# Patient Record
Sex: FEMALE | Race: ASIAN | NOT HISPANIC OR LATINO
[De-identification: names, ages, dates, MRNs, and addresses within clinical notes are randomized per-mention and may not be internally consistent; named-entity substitution may affect disease eponyms.]

---

## 2017-08-11 ENCOUNTER — APPOINTMENT (OUTPATIENT)
Dept: PHYSICAL MEDICINE AND REHAB | Facility: CLINIC | Age: 77
End: 2017-08-11
Payer: MEDICARE

## 2017-08-11 VITALS
HEART RATE: 74 BPM | DIASTOLIC BLOOD PRESSURE: 73 MMHG | TEMPERATURE: 98.3 F | OXYGEN SATURATION: 95 % | SYSTOLIC BLOOD PRESSURE: 144 MMHG

## 2017-08-11 PROCEDURE — 99213 OFFICE O/P EST LOW 20 MIN: CPT

## 2018-04-19 ENCOUNTER — APPOINTMENT (OUTPATIENT)
Dept: PHYSICAL MEDICINE AND REHAB | Facility: CLINIC | Age: 78
End: 2018-04-19
Payer: MEDICARE

## 2018-04-19 PROCEDURE — 99212 OFFICE O/P EST SF 10 MIN: CPT

## 2018-11-02 ENCOUNTER — APPOINTMENT (OUTPATIENT)
Dept: PHYSICAL MEDICINE AND REHAB | Facility: CLINIC | Age: 78
End: 2018-11-02
Payer: MEDICARE

## 2018-11-02 VITALS
HEART RATE: 70 BPM | DIASTOLIC BLOOD PRESSURE: 73 MMHG | TEMPERATURE: 98.1 F | SYSTOLIC BLOOD PRESSURE: 125 MMHG | OXYGEN SATURATION: 97 %

## 2018-11-02 PROCEDURE — 99212 OFFICE O/P EST SF 10 MIN: CPT

## 2019-05-02 ENCOUNTER — APPOINTMENT (OUTPATIENT)
Dept: PHYSICAL MEDICINE AND REHAB | Facility: CLINIC | Age: 79
End: 2019-05-02
Payer: MEDICARE

## 2019-05-02 PROCEDURE — 99212 OFFICE O/P EST SF 10 MIN: CPT

## 2019-05-02 NOTE — PHYSICAL EXAM
[de-identified] : Decreased ROM of the left shoulder and elbow, [Normal] : Oriented to person, place, and time, insight and judgement were intact and the affect was normal [de-identified] : 5/5 MS RUE/RLE, 1/5 LUE with MAS 2 spasticity at the finger flexors. 3/5 LLE KE/DF. Steppage gait

## 2019-05-02 NOTE — ASSESSMENT
[FreeTextEntry1] : Hemiplegia, Spasticity\par - Continue PT for gait and balance training and stretching LUE\par -would try AFO with controlling for genu recurvatum , will think about it \par - F/u in 6 months

## 2019-05-02 NOTE — HISTORY OF PRESENT ILLNESS
[FreeTextEntry1] : 79-year-old female presents for followup of left-sided hemiplegia. She has  a history of hemorrhagic CVA with left sided weakness.  She  walks every day.  She reports improvement in shoulder pain.walks a little less according to son, less energy and less active overall.

## 2019-11-11 ENCOUNTER — APPOINTMENT (OUTPATIENT)
Dept: PHYSICAL MEDICINE AND REHAB | Facility: CLINIC | Age: 79
End: 2019-11-11

## 2022-07-21 ENCOUNTER — EMERGENCY (EMERGENCY)
Facility: HOSPITAL | Age: 82
LOS: 1 days | Discharge: HOME | End: 2022-07-21
Attending: EMERGENCY MEDICINE
Payer: MEDICARE

## 2022-07-21 VITALS
RESPIRATION RATE: 18 BRPM | OXYGEN SATURATION: 98 % | SYSTOLIC BLOOD PRESSURE: 146 MMHG | HEART RATE: 106 BPM | TEMPERATURE: 99 F | HEIGHT: 60 IN | DIASTOLIC BLOOD PRESSURE: 80 MMHG | WEIGHT: 119.93 LBS

## 2022-07-21 LAB
ALBUMIN SERPL ELPH-MCNC: 4.5 G/DL — SIGNIFICANT CHANGE UP (ref 3.3–5)
ALP SERPL-CCNC: 115 U/L — SIGNIFICANT CHANGE UP (ref 40–120)
ALT FLD-CCNC: 37 U/L — SIGNIFICANT CHANGE UP (ref 10–45)
ANION GAP SERPL CALC-SCNC: 14 MMOL/L — SIGNIFICANT CHANGE UP (ref 5–17)
APTT BLD: 28.9 SEC — SIGNIFICANT CHANGE UP (ref 27.5–35.5)
AST SERPL-CCNC: 36 U/L — SIGNIFICANT CHANGE UP (ref 10–40)
BASOPHILS # BLD AUTO: 0.03 K/UL — SIGNIFICANT CHANGE UP (ref 0–0.2)
BASOPHILS NFR BLD AUTO: 0.3 % — SIGNIFICANT CHANGE UP (ref 0–2)
BILIRUB SERPL-MCNC: 0.4 MG/DL — SIGNIFICANT CHANGE UP (ref 0.2–1.2)
BLD GP AB SCN SERPL QL: NEGATIVE — SIGNIFICANT CHANGE UP
BUN SERPL-MCNC: 37 MG/DL — HIGH (ref 7–23)
CALCIUM SERPL-MCNC: 10.2 MG/DL — SIGNIFICANT CHANGE UP (ref 8.4–10.5)
CHLORIDE SERPL-SCNC: 102 MMOL/L — SIGNIFICANT CHANGE UP (ref 96–108)
CO2 SERPL-SCNC: 21 MMOL/L — LOW (ref 22–31)
CREAT SERPL-MCNC: 0.98 MG/DL — SIGNIFICANT CHANGE UP (ref 0.5–1.3)
EGFR: 58 ML/MIN/1.73M2 — LOW
EOSINOPHIL # BLD AUTO: 0.2 K/UL — SIGNIFICANT CHANGE UP (ref 0–0.5)
EOSINOPHIL NFR BLD AUTO: 1.8 % — SIGNIFICANT CHANGE UP (ref 0–6)
GLUCOSE SERPL-MCNC: 141 MG/DL — HIGH (ref 70–99)
HCT VFR BLD CALC: 44.8 % — SIGNIFICANT CHANGE UP (ref 34.5–45)
HGB BLD-MCNC: 13.9 G/DL — SIGNIFICANT CHANGE UP (ref 11.5–15.5)
IMM GRANULOCYTES NFR BLD AUTO: 0.3 % — SIGNIFICANT CHANGE UP (ref 0–1.5)
INR BLD: 1.08 RATIO — SIGNIFICANT CHANGE UP (ref 0.88–1.16)
LYMPHOCYTES # BLD AUTO: 2.2 K/UL — SIGNIFICANT CHANGE UP (ref 1–3.3)
LYMPHOCYTES # BLD AUTO: 20 % — SIGNIFICANT CHANGE UP (ref 13–44)
MCHC RBC-ENTMCNC: 28.9 PG — SIGNIFICANT CHANGE UP (ref 27–34)
MCHC RBC-ENTMCNC: 31 GM/DL — LOW (ref 32–36)
MCV RBC AUTO: 93.1 FL — SIGNIFICANT CHANGE UP (ref 80–100)
MONOCYTES # BLD AUTO: 0.87 K/UL — SIGNIFICANT CHANGE UP (ref 0–0.9)
MONOCYTES NFR BLD AUTO: 7.9 % — SIGNIFICANT CHANGE UP (ref 2–14)
NEUTROPHILS # BLD AUTO: 7.65 K/UL — HIGH (ref 1.8–7.4)
NEUTROPHILS NFR BLD AUTO: 69.7 % — SIGNIFICANT CHANGE UP (ref 43–77)
NRBC # BLD: 0 /100 WBCS — SIGNIFICANT CHANGE UP (ref 0–0)
PLATELET # BLD AUTO: 240 K/UL — SIGNIFICANT CHANGE UP (ref 150–400)
PLATELET RESPONSE ASPIRIN RESULT: 574 ARU — SIGNIFICANT CHANGE UP (ref 350–700)
POTASSIUM SERPL-MCNC: 4.9 MMOL/L — SIGNIFICANT CHANGE UP (ref 3.5–5.3)
POTASSIUM SERPL-SCNC: 4.9 MMOL/L — SIGNIFICANT CHANGE UP (ref 3.5–5.3)
PROT SERPL-MCNC: 9.1 G/DL — HIGH (ref 6–8.3)
PROTHROM AB SERPL-ACNC: 12.5 SEC — SIGNIFICANT CHANGE UP (ref 10.5–13.4)
RBC # BLD: 4.81 M/UL — SIGNIFICANT CHANGE UP (ref 3.8–5.2)
RBC # FLD: 13.1 % — SIGNIFICANT CHANGE UP (ref 10.3–14.5)
RH IG SCN BLD-IMP: POSITIVE — SIGNIFICANT CHANGE UP
SARS-COV-2 RNA SPEC QL NAA+PROBE: SIGNIFICANT CHANGE UP
SODIUM SERPL-SCNC: 137 MMOL/L — SIGNIFICANT CHANGE UP (ref 135–145)
WBC # BLD: 10.98 K/UL — HIGH (ref 3.8–10.5)
WBC # FLD AUTO: 10.98 K/UL — HIGH (ref 3.8–10.5)

## 2022-07-21 PROCEDURE — 70496 CT ANGIOGRAPHY HEAD: CPT | Mod: 26,MA

## 2022-07-21 PROCEDURE — 99284 EMERGENCY DEPT VISIT MOD MDM: CPT | Mod: FS,25

## 2022-07-21 PROCEDURE — 71045 X-RAY EXAM CHEST 1 VIEW: CPT | Mod: 26

## 2022-07-21 PROCEDURE — 70498 CT ANGIOGRAPHY NECK: CPT | Mod: 26,MA

## 2022-07-21 PROCEDURE — 93010 ELECTROCARDIOGRAM REPORT: CPT

## 2022-07-21 PROCEDURE — 70450 CT HEAD/BRAIN W/O DYE: CPT | Mod: 26,MG

## 2022-07-21 PROCEDURE — G1004: CPT

## 2022-07-21 RX ORDER — LEVETIRACETAM 250 MG/1
500 TABLET, FILM COATED ORAL EVERY 12 HOURS
Refills: 0 | Status: DISCONTINUED | OUTPATIENT
Start: 2022-07-21 | End: 2022-07-25

## 2022-07-21 RX ADMIN — LEVETIRACETAM 400 MILLIGRAM(S): 250 TABLET, FILM COATED ORAL at 18:51

## 2022-07-21 NOTE — ED ADULT NURSE NOTE - NSIMPLEMENTINTERV_GEN_ALL_ED
Implemented All Universal Safety Interventions:  [place] to call system. Call bell, personal items and telephone within reach. Instruct patient to call for assistance. Room bathroom lighting operational. Non-slip footwear when patient is off stretcher. Physically safe environment: no spills, clutter or unnecessary equipment. Stretcher in lowest position, wheels locked, appropriate side rails in place.

## 2022-07-21 NOTE — ED ADULT NURSE NOTE - OBJECTIVE STATEMENT
82 year old female presented to ED with daughter at bedside from home with c/o of right sided weakness and right facial droop since Monday, confirmed ICH on outpatient scan Monday. pt denies CP, SOB, nausea/vomiting, numbness/tingling, fever, cough, chills, dizziness, headache, blurred vision, neuro intact except for weakness and facial droop (not new). pt a&ox3, lung sounds clear, heart rate regular, abdomen soft nontender nondistended to palp. skin intact. IV in right AC 20G and patent. Will continue to monitor and assess while offering support and reassurance.

## 2022-07-21 NOTE — CONSULT NOTE ADULT - ASSESSMENT
Assessment:  Patient JOSIE HUGHES is a 82y (1940) wo/man with a PMHx significant for HTN, hypercholesterolemia, hyperthyroidism, and right frontal intraparenchymal hemorrhage (~10 years ago) presenting with 3 days of right sided weakness. Patient has chronic left hemiparesis with spasticity from previous right frontal IPH, now with additional subacute isolated right hemiparesis. Outpatient MRI (7/19) significant for 2.8 cm left frontal lobe IPH with surrounding vasogenic edema, CTH on admission significant for 2.9 x 1.0 x 2.1 cm acute intraparenchymal hematoma in the superior left frontal lobe.    Impression:  Subacute isolated right hemiparesis secondary to superior left parasagittal frontal lobe IPH with likely mechanism amyloid angiopathy.     Recommendations:  [] F/u repeat CTH for IPH stability  [] C/w Keppra 500 BID for seizure prophylaxis, as per neurosurgery  [] Telemetry Monitoring, neuro checks, vitals per unit protocol  [] HgA1c, Lipid profile  [] PT/Ot evaluation  [] Appreciate neurosurgery evaluation  [] If patient does not have outpatient neurologist, she can follow up with Dr. Sy after discharge. Please instruct the patient to call [phone] to schedule an appointment within the next 1-2 weeks. Office is located at [address] [facility], [place] [address].  [] Rest of care per primary team        Assessment:  Patient JOSIE HUGHES is a 82y (1940) wo/man with a PMHx significant for HTN, hypercholesterolemia, hyperthyroidism, and right frontal intraparenchymal hemorrhage (~10 years ago) presenting with 3 days of right sided weakness. Patient has chronic left hemiparesis with spasticity from previous right frontal IPH, now with additional subacute isolated right hemiparesis. Outpatient MRI (7/19) significant for 2.8 cm left frontal lobe IPH with surrounding vasogenic edema, CTH on admission significant for 2.9 x 1.0 x 2.1 cm acute intraparenchymal hematoma in the superior left frontal lobe.    Impression:  Subacute isolated right hemiparesis secondary to superior left parasagittal frontal lobe IPH with likely mechanism amyloid angiopathy.     Recommendations:  [] F/u repeat CTH for IPH stability  [] C/w Keppra 500 BID for seizure prophylaxis, as per neurosurgery  [] Telemetry Monitoring, neuro checks, vitals per unit protocol  [] HgA1c, Lipid profile  [] PT/Ot evaluation  [] Appreciate neurosurgery evaluation  [] If patient does not have outpatient neurologist, she can follow up with Dr. Sy after discharge. Please instruct the patient to call [phone] to schedule an appointment within the next 1-2 weeks. Office is located at [address] [facility], [place] [address].  [] Rest of care per primary team       discharged prior to attending evaluation      Assessment:  Patient JOSIE HUGHES is a 82y (1940) woman with a PMHx significant for HTN, hypercholesterolemia, hyperthyroidism, and right frontal intraparenchymal hemorrhage (~10 years ago) presenting with 3 days of right sided weakness. Patient has chronic left hemiparesis with spasticity from previous right frontal IPH, now with additional subacute isolated right hemiparesis. Outpatient MRI (7/19) significant for 2.8 cm left frontal lobe IPH with surrounding vasogenic edema, CTH on admission significant for 2.9 x 1.0 x 2.1 cm acute intraparenchymal hematoma in the superior left frontal lobe.    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     Recommendations:  [] F/u repeat CTH for IPH stability  [] C/w Keppra 500 BID for seizure prophylaxis, as per neurosurgery  [] Telemetry Monitoring, neuro checks, vitals per unit protocol  [] HgA1c, Lipid profile  [] PT/Ot evaluation  [] Appreciate neurosurgery evaluation  [] If patient does not have outpatient neurologist, she can follow up with Dr. Sy after discharge. Please instruct the patient to call [phone] to schedule an appointment within the next 1-2 weeks. Office is located at [address] [place] [address], [place] [address].  [] Rest of care per primary team       discharged prior to attending evaluation

## 2022-07-21 NOTE — ED PROVIDER NOTE - PATIENT PORTAL LINK FT
You can access the FollowMyHealth Patient Portal offered by [facility] by registering at the following website: [email or web address]. By joining [employer]’s FollowMyHealth portal, you will also be able to view your health information using other applications (apps) compatible with our system.

## 2022-07-21 NOTE — ED PROVIDER NOTE - PROGRESS NOTE DETAILS
nsg evaluated pt, recommending 4 hour interval scan, keppra 500 mg BID, platelet response antigen, neuro consult. Attending aware. - Jorge A Aguirre PA-C Franny Donovan DO PGY-2  Spoke with neurosurgery resident who states patient is cleared for discharge home. Franny Donovan DO PGY-2  Spoke with neurosurgery resident who states patient is cleared for discharge home. Discussed plan with patient and daughter. They have personal neurologist who they will f/u with.

## 2022-07-21 NOTE — ED PROVIDER NOTE - ATTENDING APP SHARED VISIT CONTRIBUTION OF CARE
Attending MD Erwin:   I personally have seen and examined this patient. I have performed a substantive portion of the visit including all aspects of the medical decision making.   Physician assistant note reviewed and agree on plan of care and except where noted.        82F with ho IPH with residual L sided deficits presenting with R sided weakness x 4 days, reportedly outpatient MRI with L frontal IPH but patient was observed at home and not referred to ED. Pt is awake and alert, R sided weakness noted, chronic L sided weakness as well. Plan for CT/CTA to investigate IPH further, neurology consultation         *The above represents an initial assessment/impression. Please refer to progress notes for potential changes in patient clinical course*

## 2022-07-21 NOTE — ED PROVIDER NOTE - NSFOLLOWUPINSTRUCTIONS_ED_ALL_ED_FT
If you do not have a neurologist, you can follow up with Dr. Sy. Please call [phone] to schedule an appointment within the next 1-2 weeks. Office is located at [address] [place] [address].    Continue all regular home medications as prescribed.    You were given copies of the labs and imaging results, please take them to your follow up appointments.    Return to the ER for any worsening symptoms or concerns including worsening weakness, lightheadedness, chest pain, shortness of breath, or any other concerns. Please follow up with your neurologist within 3 days.    Please follow up with neurosurgery. Call to make an appointment with Dr. Mobley:   Sheldon Mobley)  Neurosurgery  [address] [facility], [address]  [place] [address]  Phone: [phone]  Fax: [phone]    Take the Keppra 500 mg twice daily as prescribed.    Continue all regular home medications as prescribed.    You were given copies of the labs and imaging results, please take them to your follow up appointments.    Return to the ER for any worsening symptoms or concerns including worsening weakness, lightheadedness, chest pain, shortness of breath, or any other concerns.

## 2022-07-21 NOTE — ED PROVIDER NOTE - CARE PROVIDER_API CALL
Sheldon Mobley (MD)  Neurosurgery  [address] [facility], [address]  [place] [address]  Phone: [phone]  Fax: [phone]  Follow Up Time: 7-10 Days

## 2022-07-21 NOTE — ED PROVIDER NOTE - OBJECTIVE STATEMENT
82 female pmhx ICH 2012, hypothyroidism, borderline DM BIBEMS accompanied by daughter for new R side weakness and difficulty walking w/ reported ICH on outpt MRI. Hx provided by daughter at bedside, states 3 days ago she noticed patient's head appeared slumped to the right side so called PMD given hx of ICH, had MRI performed which showed a "L frontal hemorrhage" per daughter but was told to monitor it by the PMD. Today, daughter noticed pt was unable to ambulate and she was c/o R sided weakness so called EMS. Per pt, she had headache 1 week ago that she was taking Advil twice daily for. Pt alert, denies current headache, vomiting, dizziness, lightheadedness, fall, cp, sob, numbness/tingling, speech/visual changes.

## 2022-07-21 NOTE — CONSULT NOTE ADULT - ASSESSMENT
82F no AC/AP but took lots of advil for past couple wks for HA, hx of R frontal ICH (2012) with residual L arm and leg weakness. Mobile w/ cane at baseline. Had AMS on Tuesday and had MRI showing 2.8 IPH in L frontal. Today patient had difficulty standing with R side weakness. CT shows acute 3cm L frontal parasagittal IPH (same region as Tuesday), no midline shift. Area of old stroke seen on R frontal. CTA: unremarkable. PLT WNL. Exam: AOx3, PERRL, EOMI, no droop, R drift, LUE and LLE 0/5. RUE 4/4+, RLE 0/5 with exception of PF 2/5.    - repeat CT Head in 4 hours (2100)  - Keppra 500 BID  - consult stroke neurology  - Please order ARU and Coags

## 2022-07-21 NOTE — CHART NOTE - NSCHARTNOTEFT_GEN_A_CORE
Nsgy reviewed repeat CTH which appears grossly stable. , nontherapeutic. Coags wnl. Care per primary/stroke neurology.

## 2022-07-21 NOTE — CONSULT NOTE ADULT - SUBJECTIVE AND OBJECTIVE BOX
Neurology - Consult Note    -  Spectra: [phone] ([facility]), [phone] ([facility])  -    HPI: Patient JOSIE HUGHES is a 82y (1940) Telugu-speaking woman with a PMHx significant for HTN, hypercholesterolemia, hyperthyroidism, and right frontal intraparenchymal hemorrhage (~10 years ago) presenting with 3 days of right sided weakness. On Tuesday, the patient's daughter noticed that she was leaning and tilting her head to the right. The patient's daughter brought the patient to her PCP who got an MRI brain and recommended the patient come to the ED. The daughter decided not to bring the patient to the ED on Tuesday because she did not realize how significant her symptoms were as the patient did not try to walk that day. Today, the daughter encouraged the patient to walk, but she was unable to stand or ambulate due to right sided weakness. Patient also had one episode of fecal incontinence. At baseline, the patient ambulates with a cane and receives PT 2x/week. Patient denied current headache, vision changes, dizziness, numbness/tingling, and speech changes.    Majority of history and interpretation per patient's daughterMaru: [phone]    Review of Systems:   CONSTITUTIONAL: No fevers or chills  EYES AND ENT: No visual changes  RESPIRATORY: No shortness of breath  CARDIOVASCULAR: No chest pain  NEUROLOGICAL: +As stated in HPI above  All other review of systems is negative unless indicated above.    Allergies: NKDA      PMHx/PSHx/Family Hx: As above, otherwise see below   Hypertension  Hypercholesterolemia    Social Hx:  No current use of tobacco, alcohol, or illicit drugs  Lives with daughter    Medications:  MEDICATIONS  (STANDING):  levETIRAcetam  IVPB 500 milliGRAM(s) IV Intermittent every 12 hours    HOME MEDICATIONS:  -Lidocaine 5 % External Patch  -Fenofibrate CAPS Quantity: 0 Refills: 0 Ordered: 15-Dec-2015 N.P. Active	  -Simvastatin 20 MG Oral Tablet Quantity: 0 Refills: 0 Ordered: 15-Dec-2015 N.P. Active	  -Metoprolol Succinate ER 50 MG Oral Tablet Extended Release 24 Hour Quantity: 0 Refills: 0 Ordered: 15-Dec-2015 N.P. Active	  -Diovan 160 MG Oral Tablet Quantity: 0 Refills: 0 Ordered: 15-Dec-2015 N.P. Active	    Vitals:  T(C): 36.9 (07-21-22 @ 19:05), Max: 37.8 (07-21-22 @ 15:01)  HR: 102 (07-21-22 @ 19:05) (102 - 106)  BP: 149/82 (07-21-22 @ 19:05) (146/80 - 149/82)  RR: 18 (07-21-22 @ 19:05) (18 - 18)  SpO2: 100% (07-21-22 @ 19:05) (98% - 100%)    Physical Examination:   General - NAD  Cardiovascular - Peripheral pulses palpable, no edema    Neurologic Exam:  Mental status - Awake, Alert, Oriented to person, place, and time. Speech fluent, repetition and naming intact. Follows simple and complex commands.    Cranial nerves - PERRLA, VFF, EOMI, face sensation (V1-V3) intact b/l, facial strength intact without asymmetry b/l, hearing intact b/l, palate with symmetric elevation, trapezius 5/5 on right and 2/5 on left, tongue midline on protrusion with full lateral movement    Motor - Increased tone LUE and LLE. Drift on RUE, unable to assess LUE due to weakness and spasticity    Strength testing            Deltoid      Biceps      Triceps     Wrist Extension    Wrist Flexion     Interossei         R            3                 4              4                 4                    4                   4                  4  L             1                 3             3                  3                   4-       Unable to assess      4-    Patient was not cooperative with lower extremity motor examination.    Sensation - Light touch intact throughout    DTR's -             Biceps      Triceps     Brachioradialis      Patellar    Ankle    Toes/plantar response  R             3+             2+                  2+              2+            3+                Up  L              3+             3+                 3+               3+           3+                 Up, 5 beats clonus    Coordination - Finger to Nose intact on right, unable to assess left due to weakness and spasticity. No tremors appreciated    Gait - deferred    Labs:                        13.9   10.98 )-----------( 240      ( 21 Jul 2022 15:23 )             44.8     07-21    137  |  102  |  37<H>  ----------------------------<  141<H>  4.9   |  21<L>  |  0.98    Ca    10.2      21 Jul 2022 15:23    TPro  9.1<H>  /  Alb  4.5  /  TBili  0.4  /  DBili  x   /  AST  36  /  ALT  37  /  AlkPhos  115  07-21    CAPILLARY BLOOD GLUCOSE      LIVER FUNCTIONS - ( 21 Jul 2022 15:23 )  Alb: 4.5 g/dL / Pro: 9.1 g/dL / ALK PHOS: 115 U/L / ALT: 37 U/L / AST: 36 U/L / GGT: x             PT/INR - ( 21 Jul 2022 18:21 )   PT: 12.5 sec;   INR: 1.08 ratio         PTT - ( 21 Jul 2022 18:21 )  PTT:28.9 sec  CSF:      Radiology:    Outpatient MRI brain w/o 07/19/22: 2.8 cm left frontal lobe IPH with surrounding vasogenic edema, favored to be subacute in nature.   Right frontal lobe encephalomalacia with evidence of previous hemorrhage again noted.   Moderate chronic small vessel ischemic changes.     CT Head No Cont:  (21 Jul 2022 17:15)    < from: CT Angio Head w/ IV Cont (07.21.22 @ 17:18) >  CT brain: There is a 2.9 x 1.0 x 2.1 cm acute intraparenchymal hematoma   within the superior left frontal lobe, near the vertex. This results in   localized mass effect without significant midline shift.    CTA neck: No stenosis. No dissection.    CTA brain: No stenosis or aneurysm. No evidence of AVM.    < end of copied text >    
82F no AC/AP but took lots of advil for past couple wks for HA, hx of R frontal ICH (2012) with residual L arm and leg weakness. Mobile w/ cane at baseline. Had AMS on Tuesday and had MRI showing 2.8 IPH in L frontal. Today patient had difficulty standing with R side weakness. CT shows acute 3cm L frontal parasagittal IPH (same region as Tuesday), no midline shift. Area of old stroke seen on R frontal. CTA: unremarkable. PLT WNL.       --Anticoagulation--    T(C): 36.9 (07-21-22 @ 19:05), Max: 37.8 (07-21-22 @ 15:01)  HR: 102 (07-21-22 @ 19:05) (102 - 106)  BP: 149/82 (07-21-22 @ 19:05) (146/80 - 149/82)  RR: 18 (07-21-22 @ 19:05) (18 - 18)  SpO2: 100% (07-21-22 @ 19:05) (98% - 100%)  Wt(kg): --    Exam: AOx3, PERRL, EOMI, no droop, R drift, LUE and LLE 0/5. RUE 4/4+, RLE 0/5 with exception of PF 2/5.

## 2022-07-22 VITALS
SYSTOLIC BLOOD PRESSURE: 144 MMHG | DIASTOLIC BLOOD PRESSURE: 84 MMHG | RESPIRATION RATE: 18 BRPM | OXYGEN SATURATION: 95 % | TEMPERATURE: 99 F | HEART RATE: 89 BPM

## 2022-07-22 PROCEDURE — 86900 BLOOD TYPING SEROLOGIC ABO: CPT

## 2022-07-22 PROCEDURE — 85025 COMPLETE CBC W/AUTO DIFF WBC: CPT

## 2022-07-22 PROCEDURE — 96374 THER/PROPH/DIAG INJ IV PUSH: CPT | Mod: XU

## 2022-07-22 PROCEDURE — 85576 BLOOD PLATELET AGGREGATION: CPT

## 2022-07-22 PROCEDURE — 70496 CT ANGIOGRAPHY HEAD: CPT | Mod: MA

## 2022-07-22 PROCEDURE — 99285 EMERGENCY DEPT VISIT HI MDM: CPT | Mod: 25

## 2022-07-22 PROCEDURE — 71045 X-RAY EXAM CHEST 1 VIEW: CPT

## 2022-07-22 PROCEDURE — 86901 BLOOD TYPING SEROLOGIC RH(D): CPT

## 2022-07-22 PROCEDURE — 70450 CT HEAD/BRAIN W/O DYE: CPT | Mod: MG

## 2022-07-22 PROCEDURE — 80053 COMPREHEN METABOLIC PANEL: CPT

## 2022-07-22 PROCEDURE — 85610 PROTHROMBIN TIME: CPT

## 2022-07-22 PROCEDURE — 93005 ELECTROCARDIOGRAM TRACING: CPT

## 2022-07-22 PROCEDURE — 85730 THROMBOPLASTIN TIME PARTIAL: CPT

## 2022-07-22 PROCEDURE — G1004: CPT

## 2022-07-22 PROCEDURE — 36415 COLL VENOUS BLD VENIPUNCTURE: CPT

## 2022-07-22 PROCEDURE — 70498 CT ANGIOGRAPHY NECK: CPT | Mod: MA

## 2022-07-22 PROCEDURE — U0005: CPT

## 2022-07-22 PROCEDURE — 86850 RBC ANTIBODY SCREEN: CPT

## 2022-07-22 PROCEDURE — U0003: CPT

## 2022-07-22 RX ORDER — LEVETIRACETAM 250 MG/1
1 TABLET, FILM COATED ORAL
Qty: 28 | Refills: 0
Start: 2022-07-22 | End: 2022-08-04

## 2022-07-22 RX ORDER — ACETAMINOPHEN 500 MG
650 TABLET ORAL ONCE
Refills: 0 | Status: COMPLETED | OUTPATIENT
Start: 2022-07-22 | End: 2022-07-22

## 2022-07-22 RX ADMIN — Medication 650 MILLIGRAM(S): at 00:55

## 2022-07-30 ENCOUNTER — INPATIENT (INPATIENT)
Facility: HOSPITAL | Age: 82
LOS: 2 days | Discharge: NURSING, REHAB OR LONG-TERM CARE | DRG: 65 | End: 2022-08-02
Attending: PSYCHIATRY & NEUROLOGY | Admitting: PSYCHIATRY & NEUROLOGY
Payer: MEDICARE

## 2022-07-30 VITALS
DIASTOLIC BLOOD PRESSURE: 91 MMHG | HEIGHT: 60 IN | OXYGEN SATURATION: 97 % | RESPIRATION RATE: 20 BRPM | WEIGHT: 125 LBS | TEMPERATURE: 98 F | HEART RATE: 103 BPM | SYSTOLIC BLOOD PRESSURE: 134 MMHG

## 2022-07-30 DIAGNOSIS — R41.82 ALTERED MENTAL STATUS, UNSPECIFIED: ICD-10-CM

## 2022-07-30 LAB
ALBUMIN SERPL ELPH-MCNC: 3.7 G/DL — SIGNIFICANT CHANGE UP (ref 3.3–5)
ALBUMIN SERPL ELPH-MCNC: 3.7 G/DL — SIGNIFICANT CHANGE UP (ref 3.3–5)
ALP SERPL-CCNC: 103 U/L — SIGNIFICANT CHANGE UP (ref 40–120)
ALP SERPL-CCNC: 97 U/L — SIGNIFICANT CHANGE UP (ref 40–120)
ALT FLD-CCNC: 42 U/L — SIGNIFICANT CHANGE UP (ref 10–45)
ALT FLD-CCNC: 54 U/L — HIGH (ref 10–45)
AMMONIA BLD-MCNC: 18 UMOL/L — SIGNIFICANT CHANGE UP (ref 11–55)
ANION GAP SERPL CALC-SCNC: 11 MMOL/L — SIGNIFICANT CHANGE UP (ref 5–17)
ANION GAP SERPL CALC-SCNC: 14 MMOL/L — SIGNIFICANT CHANGE UP (ref 5–17)
APTT BLD: 32.3 SEC — SIGNIFICANT CHANGE UP (ref 27.5–35.5)
AST SERPL-CCNC: 129 U/L — HIGH (ref 10–40)
AST SERPL-CCNC: 33 U/L — SIGNIFICANT CHANGE UP (ref 10–40)
BASE EXCESS BLDV CALC-SCNC: -2.3 MMOL/L — LOW (ref -2–2)
BASOPHILS # BLD AUTO: 0.03 K/UL — SIGNIFICANT CHANGE UP (ref 0–0.2)
BASOPHILS NFR BLD AUTO: 0.2 % — SIGNIFICANT CHANGE UP (ref 0–2)
BILIRUB SERPL-MCNC: 0.3 MG/DL — SIGNIFICANT CHANGE UP (ref 0.2–1.2)
BILIRUB SERPL-MCNC: 0.4 MG/DL — SIGNIFICANT CHANGE UP (ref 0.2–1.2)
BUN SERPL-MCNC: 35 MG/DL — HIGH (ref 7–23)
BUN SERPL-MCNC: 36 MG/DL — HIGH (ref 7–23)
CA-I SERPL-SCNC: 1.07 MMOL/L — LOW (ref 1.15–1.33)
CALCIUM SERPL-MCNC: 8.7 MG/DL — SIGNIFICANT CHANGE UP (ref 8.4–10.5)
CALCIUM SERPL-MCNC: 8.9 MG/DL — SIGNIFICANT CHANGE UP (ref 8.4–10.5)
CHLORIDE BLDV-SCNC: 104 MMOL/L — SIGNIFICANT CHANGE UP (ref 96–108)
CHLORIDE SERPL-SCNC: 100 MMOL/L — SIGNIFICANT CHANGE UP (ref 96–108)
CHLORIDE SERPL-SCNC: 101 MMOL/L — SIGNIFICANT CHANGE UP (ref 96–108)
CO2 BLDV-SCNC: 24 MMOL/L — SIGNIFICANT CHANGE UP (ref 22–26)
CO2 SERPL-SCNC: 19 MMOL/L — LOW (ref 22–31)
CO2 SERPL-SCNC: 19 MMOL/L — LOW (ref 22–31)
CREAT SERPL-MCNC: 1.08 MG/DL — SIGNIFICANT CHANGE UP (ref 0.5–1.3)
CREAT SERPL-MCNC: 1.14 MG/DL — SIGNIFICANT CHANGE UP (ref 0.5–1.3)
EGFR: 48 ML/MIN/1.73M2 — LOW
EGFR: 52 ML/MIN/1.73M2 — LOW
EOSINOPHIL # BLD AUTO: 0.21 K/UL — SIGNIFICANT CHANGE UP (ref 0–0.5)
EOSINOPHIL NFR BLD AUTO: 1.6 % — SIGNIFICANT CHANGE UP (ref 0–6)
FLUAV AG NPH QL: SIGNIFICANT CHANGE UP
FLUBV AG NPH QL: SIGNIFICANT CHANGE UP
GAS PNL BLDV: 128 MMOL/L — LOW (ref 136–145)
GAS PNL BLDV: SIGNIFICANT CHANGE UP
GAS PNL BLDV: SIGNIFICANT CHANGE UP
GLUCOSE BLDV-MCNC: 165 MG/DL — HIGH (ref 70–99)
GLUCOSE SERPL-MCNC: 157 MG/DL — HIGH (ref 70–99)
GLUCOSE SERPL-MCNC: 176 MG/DL — HIGH (ref 70–99)
HCO3 BLDV-SCNC: 22 MMOL/L — SIGNIFICANT CHANGE UP (ref 22–29)
HCT VFR BLD CALC: 44.3 % — SIGNIFICANT CHANGE UP (ref 34.5–45)
HCT VFR BLDA CALC: 42 % — SIGNIFICANT CHANGE UP (ref 34.5–46.5)
HGB BLD CALC-MCNC: 14 G/DL — SIGNIFICANT CHANGE UP (ref 11.7–16.1)
HGB BLD-MCNC: 14.5 G/DL — SIGNIFICANT CHANGE UP (ref 11.5–15.5)
IMM GRANULOCYTES NFR BLD AUTO: 0.8 % — SIGNIFICANT CHANGE UP (ref 0–1.5)
INR BLD: 1.09 RATIO — SIGNIFICANT CHANGE UP (ref 0.88–1.16)
LACTATE BLDV-MCNC: 1.6 MMOL/L — SIGNIFICANT CHANGE UP (ref 0.7–2)
LYMPHOCYTES # BLD AUTO: 2.98 K/UL — SIGNIFICANT CHANGE UP (ref 1–3.3)
LYMPHOCYTES # BLD AUTO: 22.2 % — SIGNIFICANT CHANGE UP (ref 13–44)
MCHC RBC-ENTMCNC: 28.9 PG — SIGNIFICANT CHANGE UP (ref 27–34)
MCHC RBC-ENTMCNC: 32.7 GM/DL — SIGNIFICANT CHANGE UP (ref 32–36)
MCV RBC AUTO: 88.2 FL — SIGNIFICANT CHANGE UP (ref 80–100)
MONOCYTES # BLD AUTO: 0.92 K/UL — HIGH (ref 0–0.9)
MONOCYTES NFR BLD AUTO: 6.9 % — SIGNIFICANT CHANGE UP (ref 2–14)
NEUTROPHILS # BLD AUTO: 9.18 K/UL — HIGH (ref 1.8–7.4)
NEUTROPHILS NFR BLD AUTO: 68.3 % — SIGNIFICANT CHANGE UP (ref 43–77)
NRBC # BLD: 0 /100 WBCS — SIGNIFICANT CHANGE UP (ref 0–0)
PCO2 BLDV: 38 MMHG — LOW (ref 39–42)
PH BLDV: 7.38 — SIGNIFICANT CHANGE UP (ref 7.32–7.43)
PLATELET # BLD AUTO: 296 K/UL — SIGNIFICANT CHANGE UP (ref 150–400)
PO2 BLDV: 53 MMHG — HIGH (ref 25–45)
POTASSIUM BLDV-SCNC: 8.2 MMOL/L — CRITICAL HIGH (ref 3.5–5.1)
POTASSIUM SERPL-MCNC: 4.7 MMOL/L — SIGNIFICANT CHANGE UP (ref 3.5–5.3)
POTASSIUM SERPL-MCNC: >9 MMOL/L — CRITICAL HIGH (ref 3.5–5.3)
POTASSIUM SERPL-SCNC: 4.7 MMOL/L — SIGNIFICANT CHANGE UP (ref 3.5–5.3)
POTASSIUM SERPL-SCNC: >9 MMOL/L — CRITICAL HIGH (ref 3.5–5.3)
PROT SERPL-MCNC: 7.5 G/DL — SIGNIFICANT CHANGE UP (ref 6–8.3)
PROT SERPL-MCNC: 8.3 G/DL — SIGNIFICANT CHANGE UP (ref 6–8.3)
PROTHROM AB SERPL-ACNC: 12.5 SEC — SIGNIFICANT CHANGE UP (ref 10.5–13.4)
RBC # BLD: 5.02 M/UL — SIGNIFICANT CHANGE UP (ref 3.8–5.2)
RBC # FLD: 12.9 % — SIGNIFICANT CHANGE UP (ref 10.3–14.5)
RSV RNA NPH QL NAA+NON-PROBE: SIGNIFICANT CHANGE UP
SAO2 % BLDV: 84.4 % — SIGNIFICANT CHANGE UP (ref 67–88)
SARS-COV-2 RNA SPEC QL NAA+PROBE: SIGNIFICANT CHANGE UP
SODIUM SERPL-SCNC: 131 MMOL/L — LOW (ref 135–145)
SODIUM SERPL-SCNC: 133 MMOL/L — LOW (ref 135–145)
TROPONIN T, HIGH SENSITIVITY RESULT: 18 NG/L — SIGNIFICANT CHANGE UP (ref 0–51)
WBC # BLD: 13.43 K/UL — HIGH (ref 3.8–10.5)
WBC # FLD AUTO: 13.43 K/UL — HIGH (ref 3.8–10.5)

## 2022-07-30 PROCEDURE — 99285 EMERGENCY DEPT VISIT HI MDM: CPT

## 2022-07-30 PROCEDURE — 70498 CT ANGIOGRAPHY NECK: CPT | Mod: 26,MA

## 2022-07-30 PROCEDURE — 99221 1ST HOSP IP/OBS SF/LOW 40: CPT

## 2022-07-30 PROCEDURE — 70496 CT ANGIOGRAPHY HEAD: CPT | Mod: 26,MA

## 2022-07-30 RX ORDER — SODIUM CHLORIDE 9 MG/ML
1000 INJECTION, SOLUTION INTRAVENOUS
Refills: 0 | Status: DISCONTINUED | OUTPATIENT
Start: 2022-07-30 | End: 2022-08-02

## 2022-07-30 RX ORDER — GLUCAGON INJECTION, SOLUTION 0.5 MG/.1ML
1 INJECTION, SOLUTION SUBCUTANEOUS ONCE
Refills: 0 | Status: DISCONTINUED | OUTPATIENT
Start: 2022-07-30 | End: 2022-08-02

## 2022-07-30 RX ORDER — LEVOTHYROXINE SODIUM 125 MCG
75 TABLET ORAL DAILY
Refills: 0 | Status: DISCONTINUED | OUTPATIENT
Start: 2022-07-30 | End: 2022-08-02

## 2022-07-30 RX ORDER — LEVETIRACETAM 250 MG/1
500 TABLET, FILM COATED ORAL
Refills: 0 | Status: DISCONTINUED | OUTPATIENT
Start: 2022-07-30 | End: 2022-08-02

## 2022-07-30 RX ORDER — METOPROLOL TARTRATE 50 MG
50 TABLET ORAL DAILY
Refills: 0 | Status: DISCONTINUED | OUTPATIENT
Start: 2022-07-30 | End: 2022-08-02

## 2022-07-30 RX ORDER — SIMVASTATIN 20 MG/1
20 TABLET, FILM COATED ORAL AT BEDTIME
Refills: 0 | Status: DISCONTINUED | OUTPATIENT
Start: 2022-07-30 | End: 2022-08-02

## 2022-07-30 RX ORDER — LOSARTAN POTASSIUM 100 MG/1
50 TABLET, FILM COATED ORAL DAILY
Refills: 0 | Status: DISCONTINUED | OUTPATIENT
Start: 2022-07-30 | End: 2022-08-02

## 2022-07-30 RX ORDER — SENNA PLUS 8.6 MG/1
2 TABLET ORAL AT BEDTIME
Refills: 0 | Status: DISCONTINUED | OUTPATIENT
Start: 2022-07-30 | End: 2022-08-02

## 2022-07-30 RX ORDER — DEXTROSE 50 % IN WATER 50 %
12.5 SYRINGE (ML) INTRAVENOUS ONCE
Refills: 0 | Status: DISCONTINUED | OUTPATIENT
Start: 2022-07-30 | End: 2022-08-02

## 2022-07-30 RX ORDER — INSULIN LISPRO 100/ML
VIAL (ML) SUBCUTANEOUS
Refills: 0 | Status: DISCONTINUED | OUTPATIENT
Start: 2022-07-30 | End: 2022-08-02

## 2022-07-30 RX ORDER — DEXTROSE 50 % IN WATER 50 %
25 SYRINGE (ML) INTRAVENOUS ONCE
Refills: 0 | Status: DISCONTINUED | OUTPATIENT
Start: 2022-07-30 | End: 2022-08-02

## 2022-07-30 RX ORDER — INSULIN LISPRO 100/ML
VIAL (ML) SUBCUTANEOUS AT BEDTIME
Refills: 0 | Status: DISCONTINUED | OUTPATIENT
Start: 2022-07-30 | End: 2022-08-02

## 2022-07-30 RX ORDER — DEXTROSE 50 % IN WATER 50 %
15 SYRINGE (ML) INTRAVENOUS ONCE
Refills: 0 | Status: DISCONTINUED | OUTPATIENT
Start: 2022-07-30 | End: 2022-08-02

## 2022-07-30 RX ADMIN — SIMVASTATIN 20 MILLIGRAM(S): 20 TABLET, FILM COATED ORAL at 23:28

## 2022-07-30 RX ADMIN — LEVETIRACETAM 500 MILLIGRAM(S): 250 TABLET, FILM COATED ORAL at 17:55

## 2022-07-30 RX ADMIN — LOSARTAN POTASSIUM 50 MILLIGRAM(S): 100 TABLET, FILM COATED ORAL at 17:54

## 2022-07-30 RX ADMIN — Medication 50 MILLIGRAM(S): at 17:55

## 2022-07-30 NOTE — H&P ADULT - NSHPREVIEWOFSYSTEMS_GEN_ALL_CORE
Review of Systems:   CONSTITUTIONAL: No fevers or chills  EYES AND ENT: No visual changes  RESPIRATORY: No shortness of breath  CARDIOVASCULAR: No chest pain  NEUROLOGICAL: +As stated in HPI above  All other review of systems is negative unless indicated above.

## 2022-07-30 NOTE — ED ADULT NURSE NOTE - OBJECTIVE STATEMENT
81 yo female complaining of right side weakness - pt brought by EMS "she had a hemorrhagic stroke two weeks ago, was sent home, the 4 hour ct was unchanged and she wanted to go home. Last night pt went to bed normal and today she woke up with right side weakness. Pt had a stroke 10 years ago and has chronic left side weakness and what changed as per family is that she has weakness on right side at this time. Pt brought to ct scan, scanned under Green team and transferred to pink team right after ct scan. Report given to pink team. Pt alerted and oriented x3, vitals WNL as per EMS. 170 glucose at triage. IV placed by EMS on right hand. Stroke score 16 at ct scan (by neuro). family at the bedside. pt brought to pink, connected to cardiac monitor. Pt endorsed 81 yo female complaining of right side weakness - pt brought by EMS "she had a hemorrhagic stroke two weeks ago, was sent home, the 4 hour ct was unchanged and she wanted to go home. Last night pt went to bed normal and today she woke up with right side weakness. Pt had a stroke 10 years ago and has chronic left side weakness and what changed as per family is that she has weakness on right side at this time. Pt brought to ct scan, scanned under Green team and transferred to pink team right after ct scan. Report given to pink team. Pt alerted and oriented x3, vitals WNL as per EMS. 170 glucose at triage. IV placed by EMS on right hand. Stroke score 16 at ct scan (by neuro). family at the bedside. pt brought to pink, connected to cardiac monitor. Pt endorsed.

## 2022-07-30 NOTE — CONSULT NOTE ADULT - SUBJECTIVE AND OBJECTIVE BOX
Neurology - Consult Note    -  Spectra: [phone] ([facility]), [phone] ([facility])  -    HPI: Patient JOSIE HUGHES is a 82y (1940) Setsw[place]-speaking woman with a PMHx significant for HTN, hypercholesterolemia, hyperthyroidism, and right frontal intraparenchymal hemorrhage (~10 years ago) presenting with 3 days of right sided weakness. On Tuesday, the patient's daughter noticed that she was leaning and tilting her head to the right. The patient's daughter brought the patient to her PCP who got an MRI brain and recommended the patient come to the ED. The daughter decided not to bring the patient to the ED on Tuesday because she did not realize how significant her symptoms were as the patient did not try to walk that day. Today, the daughter encouraged the patient to walk, but she was unable to stand or ambulate due to right sided weakness. Patient also had one episode of fecal incontinence. At baseline, the patient ambulates with a cane and receives PT 2x/week. Patient denied current headache, vision changes, dizziness, numbness/tingling, and speech changes.    Majority of history and interpretation per patient's daughterMaru: [phone]    Review of Systems:   CONSTITUTIONAL: No fevers or chills  EYES AND ENT: No visual changes  RESPIRATORY: No shortness of breath  CARDIOVASCULAR: No chest pain  NEUROLOGICAL: +As stated in HPI above  All other review of systems is negative unless indicated above.    Allergies: NKDA      PMHx/PSHx/Family Hx: As above, otherwise see below   Hypertension  Hypercholesterolemia    Social Hx:  No current use of tobacco, alcohol, or illicit drugs  Lives with daughter    Medications:  MEDICATIONS  (STANDING):  levETIRAcetam  IVPB 500 milliGRAM(s) IV Intermittent every 12 hours    HOME MEDICATIONS:  -Lidocaine 5 % External Patch  -Fenofibrate CAPS Quantity: 0 Refills: 0 Ordered: 15-Dec-2015 N.P. Active	  -Simvastatin 20 MG Oral Tablet Quantity: 0 Refills: 0 Ordered: 15-Dec-2015 N.P. Active	  -Metoprolol Succinate ER 50 MG Oral Tablet Extended Release 24 Hour Quantity: 0 Refills: 0 Ordered: 15-Dec-2015 N.P. Active	  -Diovan 160 MG Oral Tablet Quantity: 0 Refills: 0 Ordered: 15-Dec-2015 N.P. Active	    Vitals:  T(C): 36.9 (07-21-22 @ 19:05), Max: 37.8 (07-21-22 @ 15:01)  HR: 102 (07-21-22 @ 19:05) (102 - 106)  BP: 149/82 (07-21-22 @ 19:05) (146/80 - 149/82)  RR: 18 (07-21-22 @ 19:05) (18 - 18)  SpO2: 100% (07-21-22 @ 19:05) (98% - 100%)    Physical Examination:   General - NAD  Cardiovascular - Peripheral pulses palpable, no edema    Neurologic Exam:  Mental status - Awake, Alert, Oriented to person, place, and time. Speech fluent, repetition and naming intact. Follows simple and complex commands.    Cranial nerves - PERRLA, VFF, EOMI, face sensation (V1-V3) intact b/l, facial strength intact without asymmetry b/l, hearing intact b/l, palate with symmetric elevation, trapezius 5/5 on right and 2/5 on left, tongue midline on protrusion with full lateral movement    Motor - Increased tone LUE and LLE. Drift on RUE, unable to assess LUE due to weakness and spasticity    Strength testing            Deltoid      Biceps      Triceps     Wrist Extension    Wrist Flexion     Interossei         R            3                 4              4                 4                    4                   4                  4  L             1                 3             3                  3                   4-       Unable to assess      4-    Patient was not cooperative with lower extremity motor examination.    Sensation - Light touch intact throughout    DTR's -             Biceps      Triceps     Brachioradialis      Patellar    Ankle    Toes/plantar response  R             3+             2+                  2+              2+            3+                Up  L              3+             3+                 3+               3+           3+                 Up, 5 beats clonus    Coordination - Finger to Nose intact on right, unable to assess left due to weakness and spasticity. No tremors appreciated    Gait - deferred    Labs:                        13.9   10.98 )-----------( 240      ( 21 Jul 2022 15:23 )             44.8     07-21    137  |  102  |  37<H>  ----------------------------<  141<H>  4.9   |  21<L>  |  0.98    Ca    10.2      21 Jul 2022 15:23    TPro  9.1<H>  /  Alb  4.5  /  TBili  0.4  /  DBili  x   /  AST  36  /  ALT  37  /  AlkPhos  115  07-21    CAPILLARY BLOOD GLUCOSE      LIVER FUNCTIONS - ( 21 Jul 2022 15:23 )  Alb: 4.5 g/dL / Pro: 9.1 g/dL / ALK PHOS: 115 U/L / ALT: 37 U/L / AST: 36 U/L / GGT: x             PT/INR - ( 21 Jul 2022 18:21 )   PT: 12.5 sec;   INR: 1.08 ratio         PTT - ( 21 Jul 2022 18:21 )  PTT:28.9 sec  CSF:      Radiology:    Outpatient MRI brain w/o 07/19/22: 2.8 cm left frontal lobe IPH with surrounding vasogenic edema, favored to be subacute in nature.   Right frontal lobe encephalomalacia with evidence of previous hemorrhage again noted.   Moderate chronic small vessel ischemic changes.     CT Head No Cont:  (21 Jul 2022 17:15)    < from: CT Angio Head w/ IV Cont (07.21.22 @ 17:18) >  CT brain: There is a 2.9 x 1.0 x 2.1 cm acute intraparenchymal hematoma   within the superior left frontal lobe, near the vertex. This results in   localized mass effect without significant midline shift.    CTA neck: No stenosis. No dissection.    CTA brain: No stenosis or aneurysm. No evidence of AVM.    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 She was evaluated in the [facility] ED on 7/21 and discharged home She presents today due to worsening R hemiparesis and  with 3 days of right sided weakness. Patient denied current headache, vision changes, dizziness, numbness/tingling, and speech changes.    Majority of history and interpretation per patient's daughter, Maru: [phone]    Review of Systems:   CONSTITUTIONAL: No fevers or chills  EYES AND ENT: No visual changes  RESPIRATORY: No shortness of breath  CARDIOVASCULAR: No chest pain  NEUROLOGICAL: +As stated in HPI above  All other review of systems is negative unless indicated above.    Allergies: NKDA      PMHx/PSHx/Family Hx: As above, otherwise see below   Hypertension  Hypercholesterolemia    Social Hx:  No current use of tobacco, alcohol, or illicit drugs  Lives with daughter    Medications:  MEDICATIONS  (STANDING):  levETIRAcetam  IVPB 500 milliGRAM(s) IV Intermittent every 12 hours    HOME MEDICATIONS:  -Lidocaine 5 % External Patch  -Fenofibrate CAPS Quantity: 0 Refills: 0 Ordered: 15-Dec-2015 N.P. Active	  -Simvastatin 20 MG Oral Tablet Quantity: 0 Refills: 0 Ordered: 15-Dec-2015 N.P. Active	  -Metoprolol Succinate ER 50 MG Oral Tablet Extended Release 24 Hour Quantity: 0 Refills: 0 Ordered: 15-Dec-2015 N.P. Active	  -Diovan 160 MG Oral Tablet Quantity: 0 Refills: 0 Ordered: 15-Dec-2015 N.P. Active	    Vitals:  T(C): 36.9 (07-21-22 @ 19:05), Max: 37.8 (07-21-22 @ 15:01)  HR: 102 (07-21-22 @ 19:05) (102 - 106)  BP: 149/82 (07-21-22 @ 19:05) (146/80 - 149/82)  RR: 18 (07-21-22 @ 19:05) (18 - 18)  SpO2: 100% (07-21-22 @ 19:05) (98% - 100%)    Physical Examination:   General - NAD  Cardiovascular - Peripheral pulses palpable, no edema    Neurologic Exam:  Mental status - Awake, Alert, Oriented to person, place, and time. Speech fluent, repetition and naming intact. Follows simple and complex commands.    Cranial nerves - PERRLA, VFF, EOMI, face sensation (V1-V3) intact b/l, facial strength intact without asymmetry b/l, hearing intact b/l, palate with symmetric elevation, trapezius 5/5 on right and 2/5 on left, tongue midline on protrusion with full lateral movement    Motor - Increased tone LUE and LLE. Drift on RUE, unable to assess LUE due to weakness and spasticity    Strength testing            Deltoid      Biceps      Triceps     Wrist Extension    Wrist Flexion     Interossei         R            3                 4              4                 4                    4                   4                  4  L             1                 3             3                  3                   4-       Unable to assess      4-    Patient was not cooperative with lower extremity motor examination.    Sensation - Light touch intact throughout    DTR's -             Biceps      Triceps     Brachioradialis      Patellar    Ankle    Toes/plantar response  R             3+             2+                  2+              2+            3+                Up  L              3+             3+                 3+               3+           3+                 Up, 5 beats clonus    Coordination - Finger to Nose intact on right, unable to assess left due to weakness and spasticity. No tremors appreciated    Gait - deferred    Labs:                        13.9   10.98 )-----------( 240      ( 21 Jul 2022 15:23 )             44.8     07-21    137  |  102  |  37<H>  ----------------------------<  141<H>  4.9   |  21<L>  |  0.98    Ca    10.2      21 Jul 2022 15:23    TPro  9.1<H>  /  Alb  4.5  /  TBili  0.4  /  DBili  x   /  AST  36  /  ALT  37  /  AlkPhos  115  07-21    CAPILLARY BLOOD GLUCOSE      LIVER FUNCTIONS - ( 21 Jul 2022 15:23 )  Alb: 4.5 g/dL / Pro: 9.1 g/dL / ALK PHOS: 115 U/L / ALT: 37 U/L / AST: 36 U/L / GGT: x             PT/INR - ( 21 Jul 2022 18:21 )   PT: 12.5 sec;   INR: 1.08 ratio         PTT - ( 21 Jul 2022 18:21 )  PTT:28.9 sec  CSF:      Radiology:    Outpatient MRI brain w/o 07/19/22: 2.8 cm left frontal lobe IPH with surrounding vasogenic edema, favored to be subacute in nature.   Right frontal lobe encephalomalacia with evidence of previous hemorrhage again noted.   Moderate chronic small vessel ischemic changes.     CT Head No Cont:  (21 Jul 2022 17:15)    < from: CT Angio Head w/ IV Cont (07.21.22 @ 17:18) >  CT brain: There is a 2.9 x 1.0 x 2.1 cm acute intraparenchymal hematoma   within the superior left frontal lobe, near the vertex. This results in   localized mass effect without significant midline shift.    CTA neck: No stenosis. No dissection.    CTA brain: No stenosis or aneurysm. No evidence of AVM.    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 She was evaluated in the [facility] ED on 7/21 and discharged home after repeat stable CTH. She presents today due to worsening R hemiparesis ands slowed respoinsiveness  with 3 days of right sided weakness. Patient denied current headache, vision changes, dizziness, numbness/tingling, and speech changes.    Majority of history and interpretation per patient's daughterMaru: [phone]    Review of Systems:   CONSTITUTIONAL: No fevers or chills  EYES AND ENT: No visual changes  RESPIRATORY: No shortness of breath  CARDIOVASCULAR: No chest pain  NEUROLOGICAL: +As stated in HPI above  All other review of systems is negative unless indicated above.    Allergies: NKDA      PMHx/PSHx/Family Hx: As above, otherwise see below   Hypertension  Hypercholesterolemia    Social Hx:  No current use of tobacco, alcohol, or illicit drugs  Lives with daughter    Medications:  MEDICATIONS  (STANDING):  levETIRAcetam  IVPB 500 milliGRAM(s) IV Intermittent every 12 hours    HOME MEDICATIONS:  -Lidocaine 5 % External Patch  -Fenofibrate CAPS Quantity: 0 Refills: 0 Ordered: 15-Dec-2015 N.P. Active	  -Simvastatin 20 MG Oral Tablet Quantity: 0 Refills: 0 Ordered: 15-Dec-2015 N.P. Active	  -Metoprolol Succinate ER 50 MG Oral Tablet Extended Release 24 Hour Quantity: 0 Refills: 0 Ordered: 15-Dec-2015 N.P. Active	  -Diovan 160 MG Oral Tablet Quantity: 0 Refills: 0 Ordered: 15-Dec-2015 N.P. Active	    Vitals:  T(C): 36.9 (07-21-22 @ 19:05), Max: 37.8 (07-21-22 @ 15:01)  HR: 102 (07-21-22 @ 19:05) (102 - 106)  BP: 149/82 (07-21-22 @ 19:05) (146/80 - 149/82)  RR: 18 (07-21-22 @ 19:05) (18 - 18)  SpO2: 100% (07-21-22 @ 19:05) (98% - 100%)    Physical Examination:   General - NAD  Cardiovascular - Peripheral pulses palpable, no edema    Neurologic Exam:  Mental status - Awake, Alert, Oriented to person, place, and time. Speech fluent, repetition and naming intact. Follows simple and complex commands.    Cranial nerves - PERRLA, VFF, EOMI, face sensation (V1-V3) intact b/l, facial strength intact without asymmetry b/l, hearing intact b/l, palate with symmetric elevation, trapezius 5/5 on right and 2/5 on left, tongue midline on protrusion with full lateral movement    Motor - Increased tone LUE and LLE. Drift on RUE, unable to assess LUE due to weakness and spasticity    Strength testing            Deltoid      Biceps      Triceps     Wrist Extension    Wrist Flexion     Interossei         R            3                 4              4                 4                    4                   4                  4  L             1                 3             3                  3                   4-       Unable to assess      4-    Patient was not cooperative with lower extremity motor examination.    Sensation - Light touch intact throughout    DTR's -             Biceps      Triceps     Brachioradialis      Patellar    Ankle    Toes/plantar response  R             3+             2+                  2+              2+            3+                Up  L              3+             3+                 3+               3+           3+                 Up, 5 beats clonus    Coordination - Finger to Nose intact on right, unable to assess left due to weakness and spasticity. No tremors appreciated    Gait - deferred    Labs:                        13.9   10.98 )-----------( 240      ( 21 Jul 2022 15:23 )             44.8     07-21    137  |  102  |  37<H>  ----------------------------<  141<H>  4.9   |  21<L>  |  0.98    Ca    10.2      21 Jul 2022 15:23    TPro  9.1<H>  /  Alb  4.5  /  TBili  0.4  /  DBili  x   /  AST  36  /  ALT  37  /  AlkPhos  115  07-21    CAPILLARY BLOOD GLUCOSE      LIVER FUNCTIONS - ( 21 Jul 2022 15:23 )  Alb: 4.5 g/dL / Pro: 9.1 g/dL / ALK PHOS: 115 U/L / ALT: 37 U/L / AST: 36 U/L / GGT: x             PT/INR - ( 21 Jul 2022 18:21 )   PT: 12.5 sec;   INR: 1.08 ratio         PTT - ( 21 Jul 2022 18:21 )  PTT:28.9 sec  CSF:      Radiology:    Outpatient MRI brain w/o 07/19/22: 2.8 cm left frontal lobe IPH with surrounding vasogenic edema, favored to be subacute in nature.   Right frontal lobe encephalomalacia with evidence of previous hemorrhage again noted.   Moderate chronic small vessel ischemic changes.     CT Head No Cont:  (21 Jul 2022 17:15)    < from: CT Angio Head w/ IV Cont (07.21.22 @ 17:18) >  CT brain: There is a 2.9 x 1.0 x 2.1 cm acute intraparenchymal hematoma   within the superior left frontal lobe, near the vertex. This results in   localized mass effect without significant midline shift.    CTA neck: No stenosis. No dissection.    CTA brain: No stenosis or aneurysm. No evidence of AVM.    < end of copied text >     Neurology - Consult Note    -  Spectra: [phone] (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 She was evaluated in the [facility] ED on 7/21 and discharged home after repeat stable CTH. She presents today due to worsening R hemiparesis and slowed responsiveness first noted by her daughter around 7pm on 7/29. LKW unclear given baseline weakness but definitely worse since yesterday per daughter. The patient is also talking less and is slower to respond. Patient denies current headache, vision changes, dizziness, numbness/tingling, or difficulty speaking. Not on AC/AP.    Majority of history and interpretation per patient's daughter, Maru: [phone]    Review of Systems:   CONSTITUTIONAL: No fevers or chills  EYES AND ENT: No visual changes  RESPIRATORY: No shortness of breath  CARDIOVASCULAR: No chest pain  NEUROLOGICAL: +As stated in HPI above  All other review of systems is negative unless indicated above.    Allergies: NKDA      PMHx/PSHx/Family Hx: As above, otherwise see below   Hypertension  Hypercholesterolemia    Social Hx:  No current use of tobacco, alcohol, or illicit drugs  Lives with daughter    HOME MEDICATIONS:  -Lidocaine 5 % External Patch  -Fenofibrate CAPS Quantity: 0 Refills: 0 Ordered: 15-Dec-2015 N.P. Active	  -Simvastatin 20 MG Oral Tablet Quantity: 0 Refills: 0 Ordered: 15-Dec-2015 N.P. Active	  -Metoprolol Succinate ER 50 MG Oral Tablet Extended Release 24 Hour Quantity: 0 Refills: 0 Ordered: 15-Dec-2015 N.P. Active	  -Diovan 160 MG Oral Tablet Quantity: 0 Refills: 0 Ordered: 15-Dec-2015 N.P. Active	      Physical Examination:   General - NAD  Cardiovascular - Peripheral pulses palpable, no edema    Neurologic Exam:  Mental status - Awake, Alert, Oriented to person, place, and time. Speech fluent, repetition and naming intact. Follows simple and complex commands.    Cranial nerves - PERRLA, VFF, EOMI, face sensation (V1-V3) intact b/l, subtle R NLF flattening, hearing intact b/l, palate with symmetric elevation, trapezius 5/5 on right and 2/5 on left, tongue midline on protrusion with full lateral movement    Motor - RUE/RLE falls to bed, LUE/LLE plegia and spasticity at baseline    Strength testing            Deltoid      Biceps      Triceps     Wrist Extension    Wrist Flexion     Interossei         R            3                 4-              4-                 4-                    4-                   4-              4-  L             0                 0             0                  0                   0     Unable to assess      0             Hip          Knee flex     Knee ext     plantar flex     dorsiflex  R        0                 3                2                  3                   3  L        0                  0                0                  0                  0    Sensation - Light touch intact throughout, extinction on the right to DSS    DTR's -             Biceps      Triceps     Brachioradialis      Patellar    Ankle    Toes/plantar response  R             3+             3+                  3+              3+            3+                Up  L              3+             3+                 3+               3+           3+                 Up, 5 beats clonus    Coordination - Finger to Nose intact on right, unable to assess left due to weakness and spasticity. No tremors appreciated    Gait - deferred      Radiology:    Outpatient MRI brain w/o 07/19/22: 2.8 cm left frontal lobe IPH with surrounding vasogenic edema, favored to be subacute in nature.   Right frontal lobe encephalomalacia with evidence of previous hemorrhage again noted.   Moderate chronic small vessel ischemic changes.     CT Head No Cont:  (21 Jul 2022 17:15)  < from: CT Angio Head w/ IV Cont (07.21.22 @ 17:18) >  CT brain: There is a 2.9 x 1.0 x 2.1 cm acute intraparenchymal hematoma   within the superior left frontal lobe, near the vertex. This results in   localized mass effect without significant midline shift.  CTA neck: No stenosis. No dissection.  CTA brain: No stenosis or aneurysm. No evidence of AVM.  < end of copied text >    CT head/CTA head and neck 30 Jul 2022:  Redemonstrated subacute intraparenchymal hemorrhage in the left frontal   lobe has decreased in size since the prior study. No new acute findings.  No LVO or significant stenosis

## 2022-07-30 NOTE — ED ADULT NURSE NOTE - NSIMPLEMENTINTERV_GEN_ALL_ED
Implemented All Fall Risk Interventions:  [place] to call system. Call bell, personal items and telephone within reach. Instruct patient to call for assistance. Room bathroom lighting operational. Non-slip footwear when patient is off stretcher. Physically safe environment: no spills, clutter or unnecessary equipment. Stretcher in lowest position, wheels locked, appropriate side rails in place. Provide visual cue, wrist band, yellow gown, etc. Monitor gait and stability. Monitor for mental status changes and reorient to person, place, and time. Review medications for side effects contributing to fall risk. Reinforce activity limits and safety measures with patient and family.

## 2022-07-30 NOTE — CONSULT NOTE ADULT - ASSESSMENT
Assessment:  Patient JOSIE HUGHES is a 82y (1940) woman with a PMHx significant for HTN, hypercholesterolemia, hyperthyroidism, and right frontal intraparenchymal hemorrhage (~10 years ago) presenting with 3 days of right sided weakness. Patient has chronic left hemiparesis with spasticity from previous right frontal IPH, now with additional subacute isolated right hemiparesis. Outpatient MRI (7/19) significant for 2.8 cm left frontal lobe IPH with surrounding vasogenic edema, CTH on admission significant for 2.9 x 1.0 x 2.1 cm acute intraparenchymal hematoma in the superior left frontal lobe.    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     Recommendations:  [] F/u repeat CTH for IPH stability  [] C/w Keppra 500 BID for seizure prophylaxis, as per neurosurgery  [] Telemetry Monitoring, neuro checks, vitals per unit protocol  [] HgA1c, Lipid profile  [] PT/Ot evaluation  [] Appreciate neurosurgery evaluation  [] If patient does not have outpatient neurologist, she can follow up with Dr. Sy after discharge. Please instruct the patient to call [phone] to schedule an appointment within the next 1-2 weeks. Office is located at [address] [place] [address], [place] [address].  [] Rest of care per primary team       discharged prior to attending evaluation      Assessment:  81yo RH primarily Hebrew-speaking woman with a PMH of HTN, hypercholesterolemia, hyperthyroidism, right frontal intraparenchymal hemorrhage in 2012 w/ residual L hemiplegia and recent L frontal IPH presenting with progressively worsening R hemiparesis. No acute intracranial abnormality on repeat CTH aside from expected evolution of L frontal IPH.     ICH: 1  NIHSS: 16    Impression:  Right hemiparesis secondary to subacute superior left parasagittal frontal lobe IPH, possibly secondary to HTN vs cerebral amyloid angiopathy.     Recommendations:  [] BP <160/90  [] NSGY consulted, no acute intervention  [] Avoid antiplatelet medications or pharmacologic DVT prophylaxis  [] Telemetry Monitoring, neuro checks, vitals per unit protocol  [] Recommend admission to medicine for PT/OT evaluation

## 2022-07-30 NOTE — H&P ADULT - ATTENDING COMMENTS
Ms. 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  She was diagnosed to have left hemispheric lobar intracerebral hemorrhage on 7/19/2022 however, refused to stay in the hospital.  I confirmed above details with the son at bedside who helps with translation.  Today on neurological exam she is alert, awake knows she is in the hospital has residual dysarthria and, right facial droop and right hemiparesis impression: #1 left hemispheric lobar intracerebral hemorrhage  2.  Right hemispheric chronic encephalomalacia–secondary to right frontal hemorrhage in 2012  So far etiology is unknown, however, based on the location and hemorrhage I suspect cerebral amyloid angiopathy  Plan:  - Avoid any antiplatelets or therapeutic anticoagulation  - BP goal - <160/90; followed  by gradual normotension   - SCDs for DVT prophylaxis for now. Would consider pharmacological DVT prophylaxis at 48 hours after establishing stability of the ICH with repeat brain imaging   - MRI brain with and without contrast/MRA head and neck with and without contrast  t/c catheter cerebral angiogram if still indicated after angiogram.  - HbA1C and LDL  - TTE to look for LVH concerning for long standing HTN  - Agree with aggressive vascular risk factors modifications     Above mentioned plan was discussed with patient and available family members at bedside. All the questions were answered and concerns were addressed.

## 2022-07-30 NOTE — ED ADULT NURSE REASSESSMENT NOTE - NS ED NURSE REASSESS COMMENT FT1
RN unable to locate previous neuro sheet, new Neuro assessment sheet created, pt daughter states pt is at baseline strength and mentation, pt changed and cleaned due incontinence, perineum skin intact, premafit placed.

## 2022-07-30 NOTE — ED PROVIDER NOTE - PHYSICAL EXAMINATION
GENERAL: non-toxic appearing, alert, in NAD  HEENT: atraumatic, normocephalic, Vision grossly intact, no conjunctivitis or discharge, hearing grossly intact,  no nasal discharge, epistaxis   CARDIAC: RRR, normal S1S2,  no appreciable murmurs, no cyanosis, cap refill < 2 seconds  PULM: normal work of breathing, oxygen saturation on RA wnl, CTAB, no crackles, rales, rhonchi, or wheezing  GI: abdomen nondistended, soft, nontender, no guarding or rebound tenderness, no palpable masses  NEURO: Awake, alert to self and location, follows commands, stuttering speech, PERRLA, EOMI, Strength RUE 2/5 RLE 1/5 LUE 0/5 and contracted, LLE 0/5. Sensation intact througout, mild dysmetria, peripheral vision intact  MSK: spine appears normal, no joint swelling or erythema, ranging all extremities with no appreciable loss of ROM  EXT: no peripheral edema, calf tenderness, redness or swelling  SKIN: warm, dry, and intact, no rashes  PSYCH: appropriate mood and affect GENERAL: non-toxic appearing, alert, in NAD  HEENT: atraumatic, normocephalic, Vision grossly intact, no conjunctivitis or discharge, hearing grossly intact,  no nasal discharge, epistaxis   CARDIAC: RRR, normal S1S2,  no appreciable murmurs, no cyanosis, cap refill < 2 seconds  PULM: normal work of breathing, oxygen saturation on RA wnl, CTAB, no crackles, rales, rhonchi, or wheezing  GI: abdomen nondistended, soft, nontender, no guarding or rebound tenderness, no palpable masses  NEURO: Awake, alert to self and location, follows commands, stuttering speech, PERRLA, EOMI, Strength RUE 2/5 RLE 1/5 LUE 0/5 and contracted, LLE 0/5. Sensation intact througout, mild dysmetria, peripheral vision intact  MSK: spine appears normal, no joint swelling or erythema, ranging all extremities with no appreciable loss of ROM  EXT: no peripheral edema, calf tenderness, redness or swelling  SKIN: warm, dry, and intact, no rashes  PSYCH: appropriate mood and affect  Attending Ryann Arellano: gen: nontoxic appearing, heent: atrauamatic, mmm, neck: nttp, chest: nttp, cv: rrr, lungs; bl breath sounds, ctab, abd: soft, nontender, nondistended, no peritoneal signs. ext: wwp, neuro awake, left sided hemiparesis, weakness to right upper extremitlty and lower extremity

## 2022-07-30 NOTE — H&P ADULT - HISTORY OF PRESENT ILLNESS
HPI: 83yo RH primarily Arabic-speaking woman with a PMH of HTN, hypercholesterolemia, hyperthyroidism, right frontal intraparenchymal hemorrhage in 2012 w/ residual L hemiplegia and recent L frontal IPH first diagnosed via outpatient MRI brain on 7/19/22 w/ associated R hemiparesis. She was evaluated in the [facility] ED on 7/21 and discharged home after repeat stable CTH. She presents today due to worsening R hemiparesis and slowed responsiveness first noted by her daughter around 7pm on 7/29. LKW unclear given baseline weakness but definitely worse since yesterday per daughter. The patient is also talking less and is slower to respond. Patient denies current headache, vision changes, dizziness, numbness/tingling, or difficulty speaking. Not on AC/AP.    Majority of history and interpretation per patient's daughterMaru: [phone]

## 2022-07-30 NOTE — H&P ADULT - NSHPPHYSICALEXAM_GEN_ALL_CORE
Physical Examination:   General - NAD  Cardiovascular - Peripheral pulses palpable, no edema    Neurologic Exam:  Mental status - Awake, Alert, Oriented to person, place, and time. Speech fluent, repetition and naming intact. Follows simple and complex commands.    Cranial nerves - PERRLA, VFF, EOMI, face sensation (V1-V3) intact b/l, subtle R NLF flattening, hearing intact b/l, palate with symmetric elevation, trapezius 5/5 on right and 2/5 on left, tongue midline on protrusion with full lateral movement    Motor - RUE/RLE falls to bed, LUE/LLE plegia and spasticity at baseline    Strength testing            Deltoid      Biceps      Triceps     Wrist Extension    Wrist Flexion     Interossei         R            3                 4-              4-                 4-                    4-                   4-              4-  L             0                 0             0                  0                   0     Unable to assess      0             Hip          Knee flex     Knee ext     plantar flex     dorsiflex  R        0                 3                2                  3                   3  L        0                  0                0                  0                  0    Sensation - Light touch intact throughout, extinction on the right to DSS    DTR's -             Biceps      Triceps     Brachioradialis      Patellar    Ankle    Toes/plantar response  R             3+             3+                  3+              3+            3+                Up  L              3+             3+                 3+               3+           3+                 Up, 5 beats clonus    Coordination - Finger to Nose intact on right, unable to assess left due to weakness and spasticity. No tremors appreciated    Gait - deferred

## 2022-07-30 NOTE — CONSULT NOTE ADULT - ASSESSMENT
LucitaKamila  82F no AP/AC, with hx of R frontoparietal IPH 2012 (L side deficit) and recent IPH 1 wk ago which showed 24 stability on scan and was DCd with 1 wk Keppra. On Monday, caregiver noticed body shaking c/f seizure. Family saw neurologist Tuesday which prescribed steroids (total of 20mg over 6 days). Family has noticed increasing R side weakness and expressive aphasia since prior admission. CT scan shows prior L frontal IPH which is decreased in size from last admission's scan. Exam: AO3, PERRL, EOMI, R drift, L side plegic (baseline), RUE 3/5, RLE 0/5, SILT    - no neurosurgical intervention  - PT/OT  - Recommend neurology consult

## 2022-07-30 NOTE — ED PROVIDER NOTE - ATTENDING CONTRIBUTION TO CARE
Attending MD Ryann Arellano:  I personally have seen and examined this patient.  Resident note reviewed and agree on plan of care and except where noted.  See HPI, PE, and MDM for details.

## 2022-07-30 NOTE — H&P ADULT - NSHPLABSRESULTS_GEN_ALL_CORE
Radiology:    Outpatient MRI brain w/o 07/19/22: 2.8 cm left frontal lobe IPH with surrounding vasogenic edema, favored to be subacute in nature.   Right frontal lobe encephalomalacia with evidence of previous hemorrhage again noted.   Moderate chronic small vessel ischemic changes.     CT Head No Cont:  (21 Jul 2022 17:15)  < from: CT Angio Head w/ IV Cont (07.21.22 @ 17:18) >  CT brain: There is a 2.9 x 1.0 x 2.1 cm acute intraparenchymal hematoma   within the superior left frontal lobe, near the vertex. This results in   localized mass effect without significant midline shift.  CTA neck: No stenosis. No dissection.  CTA brain: No stenosis or aneurysm. No evidence of AVM.  < end of copied text >    CT head/CTA head and neck 30 Jul 2022:  Redemonstrated subacute intraparenchymal hemorrhage in the left frontal   lobe has decreased in size since the prior study. No new acute findings.  No LVO or significant stenosis

## 2022-07-30 NOTE — H&P ADULT - ASSESSMENT
81yo RH primarily Albanian-speaking woman with a PMH of HTN, hypercholesterolemia, hyperthyroidism, right frontal intraparenchymal hemorrhage in 2012 w/ residual L hemiplegia and recent L frontal IPH presenting with progressively worsening R hemiparesis. No acute intracranial abnormality on repeat CTH aside from expected evolution of L frontal IPH.     ICH: 1  NIHSS: 16  MRS: 4    Impression:  Right hemiparesis secondary to subacute superior left parasagittal frontal lobe IPH, possibly secondary to HTN vs cerebral amyloid angiopathy.     Recommendations:  [] BP <160/90  [] NSGY consulted, no acute intervention  [] Avoid antiplatelet medications or pharmacologic DVT prophylaxis  [] Telemetry Monitoring, neuro checks, vitals per unit protocol  [] q4h neurochecks/vitals  [] HgA1c, Lipid profile  [] PT/OT/SS  [] BG  ml/dL  [] Target serum sodium 145-155mEq/L  [] Elevate head of bed to 30º    Patient case discussed with stroke fellow, Dr. Soham Dean. Further recommendations to follow upon review by stroke neurology attending, Dr. Ward.

## 2022-07-30 NOTE — ED PROVIDER NOTE - PROGRESS NOTE DETAILS
Attending Ryann Arellano: d/w neurology recommended medical admission for likely rehand and neurosurgery. no acute intervetnion Attending Ryann Arellano: d/w medicine recommend neuro admission

## 2022-07-30 NOTE — ED PROVIDER NOTE - OBJECTIVE STATEMENT
83yo F Borderline DM, Hypothyroidism, ICH 2012 (L sided deficits, poorly ambulatory at baseline) presenting by EMS for worsening R sided weakness 1 wk after discharge from hospital, at that time found to have a L frontal intraparenchymal hemorrhage, discharged home after stability scans.  Pt presenting with daughter who provides history. Since discharge, has had progressively worsening R sided deficits, last night ~7:45 pt was noted to have worsening R sided weakness with some worsening slurred speech, went to bed at 9pm. 7:45AM this morning Daughter found pt to be more lethargic and with worsening R sided weakness and R facial droop plus mild drooling and dysarthria.

## 2022-07-30 NOTE — CONSULT NOTE ADULT - SUBJECTIVE AND OBJECTIVE BOX
Kamila Landrum  82F no AP/AC, with hx of R frontoparietal IPH 2012 (L side deficit) and recent IPH 1 wk ago which showed 24 stability on scan and was DCd with 1 wk Keppra. On Monday, caregiver noticed body shaking c/f seizure. Family saw neurologist Tuesday which prescribed steroids (total of 20mg over 6 days). Family has noticed increasing R side weakness and expressive aphasia since prior admission. CT scan shows prior L frontal IPH which is decreased in size from last admission's scan.     --Anticoagulation--    T(C): 36.6 (07-30-22 @ 09:27), Max: 36.6 (07-30-22 @ 09:27)  HR: 98 (07-30-22 @ 11:03) (98 - 103)  BP: 92/64 (07-30-22 @ 11:03) (92/64 - 134/91)  RR: 18 (07-30-22 @ 11:03) (18 - 20)  SpO2: 98% (07-30-22 @ 11:03) (97% - 98%)  Wt(kg): --    Exam: AO3, PERRL, EOMI, R drift, L side plegic (baseline), RUE 3/5, RLE 0/5, SILT

## 2022-07-30 NOTE — ED PROVIDER NOTE - CLINICAL SUMMARY MEDICAL DECISION MAKING FREE TEXT BOX
81yo F w worsening weakness and R sided deficits 1 wk after ICH stable on 24hr scans. HD stable and well appearing with gross L and R sided deficits + worsening dysarthria. Concern for worsening ICH, must also consider vasospasm, infection. Will send blood cultures, metabolic work up. Given change in mental status likely admit. Will reassess after imaging and blood results. 83yo F w worsening weakness and R sided deficits 1 wk after ICH stable on 24hr scans. HD stable and well appearing with gross L and R sided deficits + worsening dysarthria. Concern for worsening ICH, must also consider vasospasm, infection. Will send blood cultures, metabolic work up. Given change in mental status likely admit. Will reassess after imaging and blood results.  Attending Ryann Arellano: 81 yo female with multiple medical issues including h/o prior cva with left sided hemiparesis presenting today with worsening right sided weakness. code stroke called upon arrival and pt taken to ct scan. d/w neurology and neurosurgery. no acute intervention at this time. will need admission for further work up. no clear source of infection on exam

## 2022-07-31 PROCEDURE — 93970 EXTREMITY STUDY: CPT | Mod: 26

## 2022-07-31 RX ORDER — ACETAMINOPHEN 500 MG
650 TABLET ORAL EVERY 6 HOURS
Refills: 0 | Status: DISCONTINUED | OUTPATIENT
Start: 2022-07-31 | End: 2022-08-02

## 2022-07-31 RX ADMIN — LOSARTAN POTASSIUM 50 MILLIGRAM(S): 100 TABLET, FILM COATED ORAL at 06:30

## 2022-07-31 RX ADMIN — LEVETIRACETAM 500 MILLIGRAM(S): 250 TABLET, FILM COATED ORAL at 16:56

## 2022-07-31 RX ADMIN — Medication 50 MILLIGRAM(S): at 06:30

## 2022-07-31 RX ADMIN — SIMVASTATIN 20 MILLIGRAM(S): 20 TABLET, FILM COATED ORAL at 21:32

## 2022-07-31 RX ADMIN — Medication 650 MILLIGRAM(S): at 16:55

## 2022-07-31 RX ADMIN — Medication 650 MILLIGRAM(S): at 17:30

## 2022-07-31 RX ADMIN — Medication 2: at 11:47

## 2022-07-31 RX ADMIN — Medication 1: at 07:43

## 2022-07-31 RX ADMIN — Medication 75 MICROGRAM(S): at 06:31

## 2022-07-31 RX ADMIN — Medication 650 MILLIGRAM(S): at 23:04

## 2022-07-31 RX ADMIN — LEVETIRACETAM 500 MILLIGRAM(S): 250 TABLET, FILM COATED ORAL at 06:30

## 2022-07-31 RX ADMIN — Medication 650 MILLIGRAM(S): at 23:34

## 2022-07-31 RX ADMIN — Medication 1: at 16:56

## 2022-07-31 RX ADMIN — Medication 650 MILLIGRAM(S): at 02:27

## 2022-07-31 RX ADMIN — Medication 650 MILLIGRAM(S): at 11:25

## 2022-07-31 RX ADMIN — Medication 650 MILLIGRAM(S): at 10:55

## 2022-07-31 RX ADMIN — Medication 650 MILLIGRAM(S): at 03:30

## 2022-07-31 NOTE — PHYSICAL THERAPY INITIAL EVALUATION ADULT - PERTINENT HX OF CURRENT PROBLEM, REHAB EVAL
Pt is a 81 y/o R-handed primarily French-speaking F with PMH: HTN, hypercholesterolemia, hyperthyroidism, R-frontal intraparenchymal hemorrhage (2012) w/ residual L hemiplegia and recent L frontal IPH first diagnosed via outpatient MRI brain on 7/19/22 w/ associated R-hemiparesis. Pt with recent admit on 7/21 and discharged home after repeat stable CTH. Pt p/w worsening CONT BELOW

## 2022-07-31 NOTE — SWALLOW BEDSIDE ASSESSMENT ADULT - SLP PERTINENT HISTORY OF CURRENT PROBLEM
HPI: 81yo RH primarily Ukrainian-speaking woman with a PMH of HTN, hypercholesterolemia, hyperthyroidism, right frontal intraparenchymal hemorrhage in 2012 w/ residual L hemiplegia and recent L frontal IPH first diagnosed via outpatient MRI brain on 7/19/22 w/ associated R hemiparesis. She was evaluated in the [facility] ED on 7/21 and discharged home after repeat stable CTH. She presents today due to worsening R hemiparesis and slowed responsiveness first noted by her daughter around 7pm on 7/29. LKW unclear given baseline weakness but definitely worse since yesterday per daughter. The patient is also talking less and is slower to respond. Patient denies current headache, vision changes, dizziness, numbness/tingling, or difficulty speaking. Not on AC/AP. Mental status - Awake, Alert, Oriented to person, place, and time. Speech fluent, repetition and naming intact. Follows simple and complex commands.

## 2022-07-31 NOTE — SWALLOW BEDSIDE ASSESSMENT ADULT - ADDITIONAL RECOMMENDATIONS
Maintain good oral hygiene.  This service will continue to follow to ensure continued tolerance to recommended diet.    GOAL:  Pt will tolerate regular diet with thin liquid without overt s/s laryngeal penetration/aspiration.

## 2022-07-31 NOTE — PHYSICAL THERAPY INITIAL EVALUATION ADULT - PRECAUTIONS/LIMITATIONS, REHAB EVAL
R-hemiparesis and slowed responsiveness first noted by her daughter around 7pm on 7/29. LKW unclear given baseline weakness. Hospital Course: CT BRAIN (7/30): Redemonstrated subacute intraparenchymal hemorrhage in L frontal lobe has decreased in size since the prior study. No new acute findings. CTA NECK (7/30): Hypoplastic L vertebral artery./no known precautions/limitations

## 2022-07-31 NOTE — PHYSICAL THERAPY INITIAL EVALUATION ADULT - ADDITIONAL COMMENTS
Pt lives with 24/7 aide in a PH +0 steps to enter, all needs met on main level. Pt was amb household distances with assist and straight cane (I) with all ADLs PTA. Pt's son reports wheelchair was used for long distances Pt owns: shower chair, cane, wheelchair.

## 2022-07-31 NOTE — SPEECH LANGUAGE PATHOLOGY EVALUATION - SLP GENERAL OBSERVATIONS
Pt received in bed. RA. Macedonian speaking.  phone offered, pt/family declined and requested daughter provide translation.

## 2022-07-31 NOTE — PHYSICAL THERAPY INITIAL EVALUATION ADULT - STRENGTHENING, PT EVAL
GOAL: Pt will improve RLE strength by one MMT grade, for increased limb stability, to improve gait and facilitate stair negotiation in 4 weeks.

## 2022-07-31 NOTE — OCCUPATIONAL THERAPY INITIAL EVALUATION ADULT - DIAGNOSIS, OT EVAL
Pt presents with decreased ROM, strength, postural stability, balance, and endurance limiting ADLs and fx mobility

## 2022-07-31 NOTE — PHYSICAL THERAPY INITIAL EVALUATION ADULT - PASSIVE RANGE OF MOTION EXAMINATION, REHAB EVAL
LUE PROM limited 2/2 spasticity/bilateral lower extremity Passive ROM was WFL (within functional limits)

## 2022-07-31 NOTE — SPEECH LANGUAGE PATHOLOGY EVALUATION - SLP PATIENT/FAMILY GOALS STATEMENT
Pt's daughter reports yesterday morning pt's speech was "more slurred" and that it is "a lot better today." Daughter states she has noticed word finding deficits, but not severe. States her expressive speech is slower than usual, receptive language is intact and cognition is believed to be appropriate for age. Daughter unable to speak to whether deficits appear worse after recent IPH.

## 2022-07-31 NOTE — SWALLOW BEDSIDE ASSESSMENT ADULT - SWALLOW EVAL: DIAGNOSIS
83yo RH F with h/o right frontal intraparenchymal hemorrhage in 2012 w/ residual L hemiplegia and recent L frontal IPH first diagnosed via outpatient MRI brain on 7/19/22 w/ associated R hemiparesis. Pt presents with a mild oral dysphagia characterized by prolonged but efficient mastication of chewables, delayed oral transit time with oral holding of PO, reduced A-P transport with mild to moderate oral residue post swallow (cleared with liquid wash). Pharyngeal phase of the swallow is overtly within functional limits. Timely trigger of the pharyngeal swallow, good hyolaryngeal elevation upon palpation and no overt s/s laryngeal penetration/aspiration across consistencies. 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 Pt presents with a mild oral dysphagia characterized by prolonged but efficient mastication of chewables, delayed oral transit time with oral holding of PO, reduced A-P transport with mild to moderate oral residue post swallow of chewables (cleared with liquid wash). Pharyngeal phase of the swallow is overtly within functional limits. Timely trigger of the pharyngeal swallow, good hyolaryngeal elevation upon palpation and no overt s/s laryngeal penetration/aspiration across consistencies.

## 2022-07-31 NOTE — OCCUPATIONAL THERAPY INITIAL EVALUATION ADULT - PRECAUTIONS/LIMITATIONS, REHAB EVAL
Pt p/w worsening R-hemiparesis and slowed responsiveness first noted by her daughter around 7pm on 7/29. LKW unclear given baseline weakness. CT BRAIN (7/30): Redemonstrated subacute intraparenchymal hemorrhage in L frontal lobe has decreased in size since the prior study. No new acute findings. CTA NECK (7/30): Hypoplastic L vertebral artery/fall precautions

## 2022-07-31 NOTE — PHYSICAL THERAPY INITIAL EVALUATION ADULT - GENERAL OBSERVATIONS, REHAB EVAL
Pt rec'd semi-supine in bed in NAD, VSS, +IVL, +puriwick, agreeable to PT. Son present at bedside t/o.

## 2022-07-31 NOTE — SWALLOW BEDSIDE ASSESSMENT ADULT - SPECIFY REASON(S)
To assess speech, language and cognitive-linguistic skills To subjectively assess the oropharyngeal swallow mechanism and r/o dysphagia

## 2022-07-31 NOTE — OCCUPATIONAL THERAPY INITIAL EVALUATION ADULT - PERTINENT HX OF CURRENT PROBLEM, REHAB EVAL
Pt is a 81 y/o R-handed primarily English-speaking F with PMH: HTN, hypercholesterolemia, hyperthyroidism, R-frontal intraparenchymal hemorrhage (2012) w/ residual L hemiplegia and recent L frontal IPH first diagnosed via outpatient MRI brain on 7/19/22 w/ associated R-hemiparesis. Pt with recent admit on 7/21 and discharged home after repeat stable CTH.

## 2022-07-31 NOTE — SWALLOW BEDSIDE ASSESSMENT ADULT - SWALLOW EVAL: RECOMMENDED FEEDING/EATING TECHNIQUES
allow for swallow between intakes/alternate food with liquid/check mouth frequently for oral residue/pocketing/maintain upright posture during/after eating for 30 mins/position upright (90 degrees)/small sips/bites

## 2022-07-31 NOTE — OCCUPATIONAL THERAPY INITIAL EVALUATION ADULT - LIVES WITH, PROFILE
Pt lives in pvt home w/ 24hr HHA, 0 SONYA and no steps inside. Pt has walk in shower, shower chair, fx mobility w/ cane and assist, WC for longer distance. Pt received assist w/ all ADLs and fx mobility PTA.

## 2022-07-31 NOTE — SPEECH LANGUAGE PATHOLOGY EVALUATION - SLP PERTINENT HISTORY OF CURRENT PROBLEM
HPI: 83yo RH primarily Maltese-speaking woman with a PMH of HTN, hypercholesterolemia, hyperthyroidism, right frontal intraparenchymal hemorrhage in 2012 w/ residual L hemiplegia and recent L frontal IPH first diagnosed via outpatient MRI brain on 7/19/22 w/ associated R hemiparesis. She was evaluated in the [facility] ED on 7/21 and discharged home after repeat stable CTH. She presents today due to worsening R hemiparesis and slowed responsiveness first noted by her daughter around 7pm on 7/29. LKW unclear given baseline weakness but definitely worse since yesterday per daughter. The patient is also talking less and is slower to respond. Patient denies current headache, vision changes, dizziness, numbness/tingling, or difficulty speaking. Not on AC/AP. Mental status - Awake, Alert, Oriented to person, place, and time. Speech fluent, repetition and naming intact. Follows simple and complex commands.

## 2022-07-31 NOTE — SPEECH LANGUAGE PATHOLOGY EVALUATION - COMMENTS
Hx cont: Cranial nerves - PERRLA, VFF, EOMI, face sensation (V1-V3) intact b/l, subtle R NLF flattening, hearing intact b/l, palate with symmetric elevation, trapezius 5/5 on right and 2/5 on left, tongue midline on protrusion with full lateral movement.  No acute intracranial abnormality on repeat CTH aside from expected evolution of L frontal IPH. NIHSS: 16. 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  NSGY consulted, no acute intervention.  IMAGIN/30 CT head: Redemonstrated subacute intraparenchymal hemorrhage in the left frontal lobe has decreased in size since the prior study. No new acute findings.    SWALLOW HX:   MBS 12 with recommendation for DYSPHAGIA I AND HONEY THICK LIQUIDS VIA TEASPOON ONLY. ALLOW FOR REPEAT SWALLOW BETWEEN INTAKES, CRUSH MEDICATION, FEED SLOWLY. MAINTAIN GOOD ORAL HYGIENE    **Passed dysphagia screen 22. Pt would benefit from restorative language and cognitive-linguistic tx post discharge.  Daughter endorses h/o dysphagia and states dysphagia has fully resolved and pt received a regular texture diet with thin liquid at home. States chewing us a little slower than usual but still functional and pt vert seldom coughs when she drinks "too much water." Cough is eliminated with reduced amount. Daughter states pt had an episode of coughing with thin liquid yesterday at the hospital when drinking in a reclined position. Daughter states this has not been duplicated. Daughter/pt educated re: importance of aspiration precautions and upright positioning during PO intake. Team to consider bedside swallow evaluation to assess the oropharyngeal swallow mechanism, r/o aspiration and determine least restrictive diet. ELINA Suh made aware. Further assessment deferred as pt requesting termination of evaluation. Opposites: 2/2  Divergent naming: impaired  Convergent naming: impaired  Word fluency: provided 2 words in 1 minute   Picture description task: Inaccurate description of picture, required verbal prompting by pt's daughter to provide details about picture RUE weakness negatively impacted pt's ability to write ->OT noted to be on board. WFL Daughter reports slurred speech improved from yesterday. Pt would benefit from restorative language and cognitive-linguistic tx post discharge.    Daughter endorses h/o dysphagia and states dysphagia has since fully resolved. Pt received a regular texture diet with thin liquid at home. States chewing is a little slower than usual but still functional and pt coughs "seldom" when she drinks "too much water." Cough is eliminated with reduced amount. Daughter states pt had an episode of coughing with thin liquid yesterday at the hospital when drinking in a reclined position. Daughter states this has not been duplicated. Daughter/pt educated re: importance of aspiration precautions and upright positioning during PO intake. Team to consider bedside swallow evaluation to assess the oropharyngeal swallow mechanism, r/o aspiration and determine least restrictive diet. ELINA Suh made aware. Further assessment deferred as pt requesting to postpone further evaluation. Will continue to assess.

## 2022-07-31 NOTE — SWALLOW BEDSIDE ASSESSMENT ADULT - SWALLOW EVAL: PATIENT/FAMILY GOALS STATEMENT
Per daughter from interview during bedside swallow evaluation this AM "Daughter endorses h/o dysphagia and states dysphagia has since fully resolved. Pt received a regular texture diet with thin liquid at home. States chewing is a little slower than usual but still functional and pt coughs "seldom" when she drinks "too much water." Cough is eliminated with reduced amount. Daughter states pt had an episode of coughing with thin liquid yesterday at the hospital when drinking in a reclined position. Daughter states this has not been duplicated. Daughter/pt educated re: importance of aspiration precautions and upright positioning during PO intake." Granddaughter at the bedside reports pt with no difficulty swallowing PTA and primarily ate softer foods and vegetables. From interview with daughter during speech and language evaluation this AM "Daughter endorses h/o dysphagia and states dysphagia has since fully resolved. Pt received a regular texture diet with thin liquid at home. States chewing is a little slower than usual but still functional and pt coughs "seldom" when she drinks "too much water." Cough is eliminated with reduced amount. Daughter states pt had an episode of coughing with thin liquid yesterday at the hospital when drinking in a reclined position. Daughter states this has not been duplicated. Daughter/pt educated re: importance of aspiration precautions and upright positioning during PO intake." Granddaughter ( a pediatric resident MD) at the bedside during this assessment reports pt with no difficulty swallowing PTA and primarily ate softer foods and vegetables.

## 2022-07-31 NOTE — SWALLOW BEDSIDE ASSESSMENT ADULT - ORAL PHASE
oral holding/Delayed oral transit time Oral holding/Delayed oral transit time Oral holding/Decreased anterior-posterior movement of the bolus/Delayed oral transit time/Palatal stasis/Lingual stasis Oral holding; Pt independently requested liquid wash which was effective in clearing residue/Decreased anterior-posterior movement of the bolus/Delayed oral transit time/Palatal stasis/Lingual stasis

## 2022-07-31 NOTE — SWALLOW BEDSIDE ASSESSMENT ADULT - SLP GENERAL OBSERVATIONS
Pt received in bed. RA. Italian speaking. Video  phone used (Demario #[id number]). Inconsistent command following.

## 2022-07-31 NOTE — OCCUPATIONAL THERAPY INITIAL EVALUATION ADULT - BALANCE TRAINING, PT EVAL
GOAL: Pt will improve dynamic standing balance to fair+ in order to improve functional mobility in 4 weeks.

## 2022-07-31 NOTE — SPEECH LANGUAGE PATHOLOGY EVALUATION - SLP DIAGNOSIS
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 Pt presents with receptive and expressive aphasia and cognitive-linguistic deficits. Language deficits include breakdown of receptive language beyond the simple levels, slowed verbal output, word finding deficits, impaired word fluency. Cognitive-linguistic deficits include impaired short term/working memory.

## 2022-08-01 LAB
A1C WITH ESTIMATED AVERAGE GLUCOSE RESULT: 7.5 % — HIGH (ref 4–5.6)
ANION GAP SERPL CALC-SCNC: 16 MMOL/L — SIGNIFICANT CHANGE UP (ref 5–17)
BUN SERPL-MCNC: 37 MG/DL — HIGH (ref 7–23)
CALCIUM SERPL-MCNC: 9.7 MG/DL — SIGNIFICANT CHANGE UP (ref 8.4–10.5)
CHLORIDE SERPL-SCNC: 101 MMOL/L — SIGNIFICANT CHANGE UP (ref 96–108)
CHOLEST SERPL-MCNC: 165 MG/DL — SIGNIFICANT CHANGE UP
CO2 SERPL-SCNC: 18 MMOL/L — LOW (ref 22–31)
CREAT SERPL-MCNC: 1.62 MG/DL — HIGH (ref 0.5–1.3)
EGFR: 32 ML/MIN/1.73M2 — LOW
ESTIMATED AVERAGE GLUCOSE: 169 MG/DL — HIGH (ref 68–114)
GLUCOSE SERPL-MCNC: 192 MG/DL — HIGH (ref 70–99)
HCT VFR BLD CALC: 44.1 % — SIGNIFICANT CHANGE UP (ref 34.5–45)
HDLC SERPL-MCNC: 36 MG/DL — LOW
HGB BLD-MCNC: 14.2 G/DL — SIGNIFICANT CHANGE UP (ref 11.5–15.5)
LIPID PNL WITH DIRECT LDL SERPL: 86 MG/DL — SIGNIFICANT CHANGE UP
MCHC RBC-ENTMCNC: 28.3 PG — SIGNIFICANT CHANGE UP (ref 27–34)
MCHC RBC-ENTMCNC: 32.2 GM/DL — SIGNIFICANT CHANGE UP (ref 32–36)
MCV RBC AUTO: 87.8 FL — SIGNIFICANT CHANGE UP (ref 80–100)
NON HDL CHOLESTEROL: 129 MG/DL — SIGNIFICANT CHANGE UP
NRBC # BLD: 0 /100 WBCS — SIGNIFICANT CHANGE UP (ref 0–0)
PLATELET # BLD AUTO: 310 K/UL — SIGNIFICANT CHANGE UP (ref 150–400)
POTASSIUM SERPL-MCNC: 4.7 MMOL/L — SIGNIFICANT CHANGE UP (ref 3.5–5.3)
POTASSIUM SERPL-SCNC: 4.7 MMOL/L — SIGNIFICANT CHANGE UP (ref 3.5–5.3)
RBC # BLD: 5.02 M/UL — SIGNIFICANT CHANGE UP (ref 3.8–5.2)
RBC # FLD: 12.9 % — SIGNIFICANT CHANGE UP (ref 10.3–14.5)
SARS-COV-2 RNA SPEC QL NAA+PROBE: SIGNIFICANT CHANGE UP
SODIUM SERPL-SCNC: 135 MMOL/L — SIGNIFICANT CHANGE UP (ref 135–145)
TRIGL SERPL-MCNC: 216 MG/DL — HIGH
WBC # BLD: 11.89 K/UL — HIGH (ref 3.8–10.5)
WBC # FLD AUTO: 11.89 K/UL — HIGH (ref 3.8–10.5)

## 2022-08-01 PROCEDURE — 70553 MRI BRAIN STEM W/O & W/DYE: CPT | Mod: 26

## 2022-08-01 PROCEDURE — 70450 CT HEAD/BRAIN W/O DYE: CPT | Mod: 26

## 2022-08-01 PROCEDURE — 93306 TTE W/DOPPLER COMPLETE: CPT | Mod: 26

## 2022-08-01 RX ORDER — TRAMADOL HYDROCHLORIDE 50 MG/1
25 TABLET ORAL ONCE
Refills: 0 | Status: DISCONTINUED | OUTPATIENT
Start: 2022-08-01 | End: 2022-08-01

## 2022-08-01 RX ORDER — NYSTATIN 500MM UNIT
500000 POWDER (EA) MISCELLANEOUS EVERY 6 HOURS
Refills: 0 | Status: DISCONTINUED | OUTPATIENT
Start: 2022-08-01 | End: 2022-08-02

## 2022-08-01 RX ORDER — SODIUM CHLORIDE 9 MG/ML
1000 INJECTION INTRAMUSCULAR; INTRAVENOUS; SUBCUTANEOUS
Refills: 0 | Status: DISCONTINUED | OUTPATIENT
Start: 2022-08-01 | End: 2022-08-02

## 2022-08-01 RX ORDER — HEPARIN SODIUM 5000 [USP'U]/ML
5000 INJECTION INTRAVENOUS; SUBCUTANEOUS EVERY 12 HOURS
Refills: 0 | Status: DISCONTINUED | OUTPATIENT
Start: 2022-08-01 | End: 2022-08-02

## 2022-08-01 RX ADMIN — LEVETIRACETAM 500 MILLIGRAM(S): 250 TABLET, FILM COATED ORAL at 18:10

## 2022-08-01 RX ADMIN — TRAMADOL HYDROCHLORIDE 25 MILLIGRAM(S): 50 TABLET ORAL at 20:37

## 2022-08-01 RX ADMIN — Medication 650 MILLIGRAM(S): at 17:23

## 2022-08-01 RX ADMIN — HEPARIN SODIUM 5000 UNIT(S): 5000 INJECTION INTRAVENOUS; SUBCUTANEOUS at 18:00

## 2022-08-01 RX ADMIN — HEPARIN SODIUM 5000 UNIT(S): 5000 INJECTION INTRAVENOUS; SUBCUTANEOUS at 18:10

## 2022-08-01 RX ADMIN — Medication 50 MILLIGRAM(S): at 05:56

## 2022-08-01 RX ADMIN — Medication 1: at 08:23

## 2022-08-01 RX ADMIN — SIMVASTATIN 20 MILLIGRAM(S): 20 TABLET, FILM COATED ORAL at 21:25

## 2022-08-01 RX ADMIN — LOSARTAN POTASSIUM 50 MILLIGRAM(S): 100 TABLET, FILM COATED ORAL at 06:44

## 2022-08-01 RX ADMIN — LEVETIRACETAM 500 MILLIGRAM(S): 250 TABLET, FILM COATED ORAL at 05:55

## 2022-08-01 RX ADMIN — Medication 75 MICROGRAM(S): at 05:55

## 2022-08-01 RX ADMIN — Medication 650 MILLIGRAM(S): at 17:56

## 2022-08-01 RX ADMIN — TRAMADOL HYDROCHLORIDE 25 MILLIGRAM(S): 50 TABLET ORAL at 20:07

## 2022-08-01 RX ADMIN — Medication 2: at 11:35

## 2022-08-01 NOTE — PROGRESS NOTE ADULT - ASSESSMENT
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 No acute intracranial abnormality on repeat CTH aside from expected evolution of L frontal IPH. Admitted for PT/OT evaluation.     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     NEURO: Continue close monitoring for neurologic deterioration, permissive HTN, titrate statin to LDL goal less than 70, MRI Brain w/o, MRA Head w/o and Neck w/contrast. Physical therapy/OT/Speech eval/treatment.     ANTITHROMBOTIC THERAPY: none due to IPH    PULMONARY: CXR clear, protecting airway, saturating well     CARDIOVASCULAR: check TTE, cardiac monitoring                              SBP goal: <160    GASTROINTESTINAL:  dysphagia screen- passed, tolerating diet      Diet: Regular    RENAL: BUN/Cr stable, good urine output      Na Goal: Greater than 135     Rivero:    HEMATOLOGY: H/H stable, Platelets normal      DVT ppx: Heparin s.c [] LMWH []     ID: afebrile, no leukocytosis     OTHER:     DISPOSITION: [facility] once stable and workup is complete      CORE MEASURES:        Admission NIHSS:      TPA: [] YES [] NO      LDL/HDL:     Depression Screen:      Statin Therapy:     Dysphagia Screen: [] PASS [] FAIL     Smoking [] YES [] NO      Afib [] YES [] NO     Stroke Education [] YES [] NO    Obtain screening lower extremity venous ultrasound in patients who meet 1 or more of the following criteria as patient is high risk for DVT/PE on admission:   [] History of DVT/PE  []Hypercoagulable states (Factor V Leiden, Cancer, OCP, etc. )  []Prolonged immobility (hemiplegia/hemiparesis/post operative or any other extended immobilization)  [] Transferred from outside facility (Rehab or Long term care)  [] Age </= to 50. 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 No acute intracranial abnormality on repeat CTH aside from expected evolution of L frontal IPH. Admitted for PT/OT evaluation.     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     NEURO: Continue close monitoring for neurologic deterioration, SBP < 160 ,MRI Brain w/  pending. Physical therapy/OT recommends [facility].    ANTITHROMBOTIC THERAPY: none due to IPH    PULMONARY: CXR clear, protecting airway, saturating well     CARDIOVASCULAR: check TTE, cardiac monitoring                              SBP goal: <160    GASTROINTESTINAL:  dysphagia screen- passed, tolerating diet      Diet: Regular    RENAL: BUN/Cr 37/1.62, SURJIT will encourage PO fluid     Na Goal: Greater than 135     Rivero: N    HEMATOLOGY: H/H stable, Platelets 310, [facility] Duplex LE: No evidence of deep venous thrombosis in either lower extremity.      DVT ppx: Venodynes will start chemical pending repeat CT Head    ID: afebrile, no leukocytosis     OTHER: Plan of care discussed with family at bedside    DISPOSITION: [facility] once stable and workup is complete      CORE MEASURES:        Admission NIHSS: 16     TPA: [] YES [x] NO      LDL/HDL: P     Depression Screen: 0      Statin Therapy: Y     Dysphagia Screen: [x] PASS [] FAIL     Smoking [] YES [] NO      Afib [] YES [x] NO     Stroke Education [x] YES [] NO    Obtain screening lower extremity venous ultrasound in patients who meet 1 or more of the following criteria as patient is high risk for DVT/PE on admission:   [] History of DVT/PE  []Hypercoagulable states (Factor V Leiden, Cancer, OCP, etc. )  [x]Prolonged immobility (hemiplegia/hemiparesis/post operative or any other extended immobilization)  [] Transferred from outside facility (Rehab or Long term care)  [] Age </= to 50.

## 2022-08-01 NOTE — PROGRESS NOTE ADULT - SUBJECTIVE AND OBJECTIVE BOX
THE PATIENT WAS SEEN AND EXAMINED BY ME WITH THE HOUSESTAFF AND STROKE TEAM DURING MORNING ROUNDS.   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 She was evaluated in the [facility] ED on 7/21 and discharged home after repeat stable CTH. She presents today due to worsening R hemiparesis and slowed responsiveness first noted by her daughter around 7pm on 7/29. LKW unclear given baseline weakness but definitely worse since yesterday per daughter. The patient is also talking less and is slower to respond. Patient denies current headache, vision changes, dizziness, numbness/tingling, or difficulty speaking. Not on AC/AP.       SUBJECTIVE: No events overnight.  No new neurologic complaints.      acetaminophen     Tablet .. 650 milliGRAM(s) Oral every 6 hours PRN  dextrose 5%. 1000 milliLiter(s) IV Continuous <Continuous>  dextrose 5%. 1000 milliLiter(s) IV Continuous <Continuous>  dextrose 50% Injectable 25 Gram(s) IV Push once  dextrose 50% Injectable 12.5 Gram(s) IV Push once  dextrose 50% Injectable 25 Gram(s) IV Push once  dextrose Oral Gel 15 Gram(s) Oral once PRN  glucagon  Injectable 1 milliGRAM(s) IntraMuscular once  insulin lispro (ADMELOG) corrective regimen sliding scale   SubCutaneous three times a day before meals  insulin lispro (ADMELOG) corrective regimen sliding scale   SubCutaneous at bedtime  levETIRAcetam 500 milliGRAM(s) Oral two times a day  levothyroxine 75 MICROGram(s) Oral daily  losartan 50 milliGRAM(s) Oral daily  metoprolol succinate ER 50 milliGRAM(s) Oral daily  senna 2 Tablet(s) Oral at bedtime PRN  simvastatin 20 milliGRAM(s) Oral at bedtime      PHYSICAL EXAM:   Vital Signs Last 24 Hrs  T(C): 36.7 (01 Aug 2022 05:36), Max: 36.9 (31 Jul 2022 09:43)  T(F): 98 (01 Aug 2022 05:36), Max: 98.4 (31 Jul 2022 09:43)  HR: 97 (01 Aug 2022 06:40) (78 - 104)  BP: 110/74 (01 Aug 2022 06:40) (108/70 - 136/77)  RR: 18 (01 Aug 2022 05:36) (17 - 18)  SpO2: 96% (01 Aug 2022 05:36) (94% - 97%)    Parameters below as of 01 Aug 2022 05:36  Patient On (Oxygen Delivery Method): room air        General: No acute distress  HEENT: EOM intact, visual fields full  Abdomen: Soft, nontender, nondistended   Extremities: No edema    NEUROLOGICAL EXAM:  Mental status: Awake, alert, oriented x3, no aphasia, no neglect, normal memory   Cranial Nerves: No facial asymmetry, no nystagmus, no dysarthria,  tongue midline  Motor exam: Normal tone, no drift, 5/5 RUE, 5/5 RLE, 5/5 LUE, 5/5 LLE, normal fine finger movements.  Sensation: Intact to light touch   Coordination/ Gait: No dysmetria, ILIANA intact and symmetric bilaterally    LABS:                        14.2   11.89 )-----------( 310      ( 01 Aug 2022 06:38 )             44.1    07-30    133<L>  |  100  |  35<H>  ----------------------------<  157<H>  4.7   |  19<L>  |  1.14    Ca    8.9      30 Jul 2022 12:27    TPro  7.5  /  Alb  3.7  /  TBili  0.3  /  DBili  x   /  AST  33  /  ALT  42  /  AlkPhos  103  07-30  PT/INR - ( 30 Jul 2022 09:35 )   PT: 12.5 sec;   INR: 1.09 ratio         PTT - ( 30 Jul 2022 09:35 )  PTT:32.3 sec      IMAGING: Reviewed by me.     07/30/22:   CT HEAD: Redemonstrated subacute intraparenchymal hemorrhage in the left frontal   lobe has decreased in size since the prior study. No new acute findings.     CTA BRAIN: Patent intracranial circulation. No flow-limiting stenosis or   occlusion.    CTA NECK: Hypoplastic left vertebral artery.   THE PATIENT WAS SEEN AND EXAMINED BY ME WITH THE HOUSESTAFF AND STROKE TEAM DURING MORNING ROUNDS.   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 She was evaluated in the [facility] ED on 7/21 and discharged home after repeat stable CTH. She presents today due to worsening R hemiparesis and slowed responsiveness first noted by her daughter around 7pm on 7/29. LKW unclear given baseline weakness but definitely worse since yesterday per daughter. The patient is also talking less and is slower to respond. Patient denies current headache, vision changes, dizziness, numbness/tingling, or difficulty speaking. Not on AC/AP.       SUBJECTIVE: No events overnight.  No new neurologic complaints.      acetaminophen     Tablet .. 650 milliGRAM(s) Oral every 6 hours PRN  dextrose 5%. 1000 milliLiter(s) IV Continuous <Continuous>  dextrose 5%. 1000 milliLiter(s) IV Continuous <Continuous>  dextrose 50% Injectable 25 Gram(s) IV Push once  dextrose 50% Injectable 12.5 Gram(s) IV Push once  dextrose 50% Injectable 25 Gram(s) IV Push once  dextrose Oral Gel 15 Gram(s) Oral once PRN  glucagon  Injectable 1 milliGRAM(s) IntraMuscular once  insulin lispro (ADMELOG) corrective regimen sliding scale   SubCutaneous three times a day before meals  insulin lispro (ADMELOG) corrective regimen sliding scale   SubCutaneous at bedtime  levETIRAcetam 500 milliGRAM(s) Oral two times a day  levothyroxine 75 MICROGram(s) Oral daily  losartan 50 milliGRAM(s) Oral daily  metoprolol succinate ER 50 milliGRAM(s) Oral daily  senna 2 Tablet(s) Oral at bedtime PRN  simvastatin 20 milliGRAM(s) Oral at bedtime      PHYSICAL EXAM:   Vital Signs Last 24 Hrs  T(C): 36.7 (01 Aug 2022 05:36), Max: 36.9 (31 Jul 2022 09:43)  T(F): 98 (01 Aug 2022 05:36), Max: 98.4 (31 Jul 2022 09:43)  HR: 97 (01 Aug 2022 06:40) (78 - 104)  BP: 110/74 (01 Aug 2022 06:40) (108/70 - 136/77)  RR: 18 (01 Aug 2022 05:36) (17 - 18)  SpO2: 96% (01 Aug 2022 05:36) (94% - 97%)    Parameters below as of 01 Aug 2022 05:36  Patient On (Oxygen Delivery Method): room air       ID: [id number]  General: No acute distress  HEENT: EOM intact, visual fields full  Abdomen: Soft, nontender, nondistended   Extremities: No edema    NEUROLOGICAL EXAM:  Mental status: Awake, alert, oriented x2, no aphasia  Cranial Nerves: mild right facial palsy, no nystagmus, no dysarthria,  tongue midline  Motor exam:  5/5 RUE, 2/5 RLE, LUE/LLE plegia and spasticity at baseline  Sensation: Intact to light touch   Coordination/ Gait: No dysmetria, gait not assessed    LABS:                        14.2   11.89 )-----------( 310      ( 01 Aug 2022 06:38 )             44.1    07-30    133<L>  |  100  |  35<H>  ----------------------------<  157<H>  4.7   |  19<L>  |  1.14    Ca    8.9      30 Jul 2022 12:27    TPro  7.5  /  Alb  3.7  /  TBili  0.3  /  DBili  x   /  AST  33  /  ALT  42  /  AlkPhos  103  07-30  PT/INR - ( 30 Jul 2022 09:35 )   PT: 12.5 sec;   INR: 1.09 ratio         PTT - ( 30 Jul 2022 09:35 )  PTT:32.3 sec      IMAGING: Reviewed by me.     07/30/22:   CT HEAD: Redemonstrated subacute intraparenchymal hemorrhage in the left frontal   lobe has decreased in size since the prior study. No new acute findings.     CTA BRAIN: Patent intracranial circulation. No flow-limiting stenosis or   occlusion.    CTA NECK: Hypoplastic left vertebral artery.

## 2022-08-02 ENCOUNTER — TRANSCRIPTION ENCOUNTER (OUTPATIENT)
Age: 82
End: 2022-08-02

## 2022-08-02 VITALS
OXYGEN SATURATION: 97 % | SYSTOLIC BLOOD PRESSURE: 145 MMHG | TEMPERATURE: 98 F | RESPIRATION RATE: 18 BRPM | HEART RATE: 87 BPM | DIASTOLIC BLOOD PRESSURE: 88 MMHG

## 2022-08-02 LAB
ANION GAP SERPL CALC-SCNC: 14 MMOL/L — SIGNIFICANT CHANGE UP (ref 5–17)
BUN SERPL-MCNC: 33 MG/DL — HIGH (ref 7–23)
CALCIUM SERPL-MCNC: 8.9 MG/DL — SIGNIFICANT CHANGE UP (ref 8.4–10.5)
CHLORIDE SERPL-SCNC: 104 MMOL/L — SIGNIFICANT CHANGE UP (ref 96–108)
CO2 SERPL-SCNC: 18 MMOL/L — LOW (ref 22–31)
CREAT SERPL-MCNC: 1.32 MG/DL — HIGH (ref 0.5–1.3)
EGFR: 41 ML/MIN/1.73M2 — LOW
GLUCOSE SERPL-MCNC: 173 MG/DL — HIGH (ref 70–99)
HCT VFR BLD CALC: 40.4 % — SIGNIFICANT CHANGE UP (ref 34.5–45)
HGB BLD-MCNC: 13.1 G/DL — SIGNIFICANT CHANGE UP (ref 11.5–15.5)
MCHC RBC-ENTMCNC: 29 PG — SIGNIFICANT CHANGE UP (ref 27–34)
MCHC RBC-ENTMCNC: 32.4 GM/DL — SIGNIFICANT CHANGE UP (ref 32–36)
MCV RBC AUTO: 89.4 FL — SIGNIFICANT CHANGE UP (ref 80–100)
NRBC # BLD: 0 /100 WBCS — SIGNIFICANT CHANGE UP (ref 0–0)
PLATELET # BLD AUTO: 296 K/UL — SIGNIFICANT CHANGE UP (ref 150–400)
POTASSIUM SERPL-MCNC: 4.5 MMOL/L — SIGNIFICANT CHANGE UP (ref 3.5–5.3)
POTASSIUM SERPL-SCNC: 4.5 MMOL/L — SIGNIFICANT CHANGE UP (ref 3.5–5.3)
RBC # BLD: 4.52 M/UL — SIGNIFICANT CHANGE UP (ref 3.8–5.2)
RBC # FLD: 12.7 % — SIGNIFICANT CHANGE UP (ref 10.3–14.5)
SODIUM SERPL-SCNC: 136 MMOL/L — SIGNIFICANT CHANGE UP (ref 135–145)
WBC # BLD: 8.62 K/UL — SIGNIFICANT CHANGE UP (ref 3.8–10.5)
WBC # FLD AUTO: 8.62 K/UL — SIGNIFICANT CHANGE UP (ref 3.8–10.5)

## 2022-08-02 PROCEDURE — 92507 TX SP LANG VOICE COMM INDIV: CPT

## 2022-08-02 PROCEDURE — 82330 ASSAY OF CALCIUM: CPT

## 2022-08-02 PROCEDURE — 87637 SARSCOV2&INF A&B&RSV AMP PRB: CPT

## 2022-08-02 PROCEDURE — 93005 ELECTROCARDIOGRAM TRACING: CPT

## 2022-08-02 PROCEDURE — 80061 LIPID PANEL: CPT

## 2022-08-02 PROCEDURE — 85027 COMPLETE CBC AUTOMATED: CPT

## 2022-08-02 PROCEDURE — 83605 ASSAY OF LACTIC ACID: CPT

## 2022-08-02 PROCEDURE — 97162 PT EVAL MOD COMPLEX 30 MIN: CPT

## 2022-08-02 PROCEDURE — 84484 ASSAY OF TROPONIN QUANT: CPT

## 2022-08-02 PROCEDURE — 97110 THERAPEUTIC EXERCISES: CPT

## 2022-08-02 PROCEDURE — 70496 CT ANGIOGRAPHY HEAD: CPT | Mod: MA

## 2022-08-02 PROCEDURE — 87040 BLOOD CULTURE FOR BACTERIA: CPT

## 2022-08-02 PROCEDURE — 99285 EMERGENCY DEPT VISIT HI MDM: CPT | Mod: 25

## 2022-08-02 PROCEDURE — 92526 ORAL FUNCTION THERAPY: CPT

## 2022-08-02 PROCEDURE — 36415 COLL VENOUS BLD VENIPUNCTURE: CPT

## 2022-08-02 PROCEDURE — 97530 THERAPEUTIC ACTIVITIES: CPT

## 2022-08-02 PROCEDURE — 84132 ASSAY OF SERUM POTASSIUM: CPT

## 2022-08-02 PROCEDURE — U0005: CPT

## 2022-08-02 PROCEDURE — 85018 HEMOGLOBIN: CPT

## 2022-08-02 PROCEDURE — 92523 SPEECH SOUND LANG COMPREHEN: CPT

## 2022-08-02 PROCEDURE — 80048 BASIC METABOLIC PNL TOTAL CA: CPT

## 2022-08-02 PROCEDURE — U0003: CPT

## 2022-08-02 PROCEDURE — 70553 MRI BRAIN STEM W/O & W/DYE: CPT

## 2022-08-02 PROCEDURE — 82962 GLUCOSE BLOOD TEST: CPT

## 2022-08-02 PROCEDURE — 93306 TTE W/DOPPLER COMPLETE: CPT

## 2022-08-02 PROCEDURE — 82140 ASSAY OF AMMONIA: CPT

## 2022-08-02 PROCEDURE — 84295 ASSAY OF SERUM SODIUM: CPT

## 2022-08-02 PROCEDURE — 82803 BLOOD GASES ANY COMBINATION: CPT

## 2022-08-02 PROCEDURE — 82947 ASSAY GLUCOSE BLOOD QUANT: CPT

## 2022-08-02 PROCEDURE — 85730 THROMBOPLASTIN TIME PARTIAL: CPT

## 2022-08-02 PROCEDURE — 85610 PROTHROMBIN TIME: CPT

## 2022-08-02 PROCEDURE — 85014 HEMATOCRIT: CPT

## 2022-08-02 PROCEDURE — 97165 OT EVAL LOW COMPLEX 30 MIN: CPT

## 2022-08-02 PROCEDURE — 70450 CT HEAD/BRAIN W/O DYE: CPT | Mod: MA

## 2022-08-02 PROCEDURE — 70498 CT ANGIOGRAPHY NECK: CPT | Mod: MA

## 2022-08-02 PROCEDURE — 82435 ASSAY OF BLOOD CHLORIDE: CPT

## 2022-08-02 PROCEDURE — 93970 EXTREMITY STUDY: CPT

## 2022-08-02 PROCEDURE — 83036 HEMOGLOBIN GLYCOSYLATED A1C: CPT

## 2022-08-02 PROCEDURE — 85025 COMPLETE CBC W/AUTO DIFF WBC: CPT

## 2022-08-02 PROCEDURE — 80053 COMPREHEN METABOLIC PANEL: CPT

## 2022-08-02 PROCEDURE — 92610 EVALUATE SWALLOWING FUNCTION: CPT

## 2022-08-02 RX ORDER — METFORMIN HYDROCHLORIDE 850 MG/1
1 TABLET ORAL
Qty: 0 | Refills: 0 | DISCHARGE

## 2022-08-02 RX ORDER — OLMESARTAN MEDOXOMIL 5 MG/1
1 TABLET, FILM COATED ORAL
Qty: 0 | Refills: 0 | DISCHARGE

## 2022-08-02 RX ORDER — METOPROLOL TARTRATE 50 MG
1 TABLET ORAL
Qty: 0 | Refills: 0 | DISCHARGE
Start: 2022-08-02

## 2022-08-02 RX ORDER — LOSARTAN POTASSIUM 100 MG/1
1 TABLET, FILM COATED ORAL
Qty: 0 | Refills: 0 | DISCHARGE
Start: 2022-08-02

## 2022-08-02 RX ORDER — LEVETIRACETAM 250 MG/1
1 TABLET, FILM COATED ORAL
Qty: 0 | Refills: 0 | DISCHARGE
Start: 2022-08-02

## 2022-08-02 RX ORDER — NYSTATIN 500MM UNIT
5 POWDER (EA) MISCELLANEOUS
Qty: 0 | Refills: 0 | DISCHARGE
Start: 2022-08-02

## 2022-08-02 RX ORDER — TRAMADOL HYDROCHLORIDE 50 MG/1
25 TABLET ORAL ONCE
Refills: 0 | Status: DISCONTINUED | OUTPATIENT
Start: 2022-08-02 | End: 2022-08-02

## 2022-08-02 RX ORDER — SIMVASTATIN 20 MG/1
1 TABLET, FILM COATED ORAL
Qty: 0 | Refills: 0 | DISCHARGE

## 2022-08-02 RX ORDER — HEPARIN SODIUM 5000 [USP'U]/ML
5000 INJECTION INTRAVENOUS; SUBCUTANEOUS
Qty: 0 | Refills: 0 | DISCHARGE
Start: 2022-08-02

## 2022-08-02 RX ORDER — INSULIN LISPRO 100/ML
0 VIAL (ML) SUBCUTANEOUS
Qty: 0 | Refills: 0 | DISCHARGE
Start: 2022-08-02

## 2022-08-02 RX ORDER — LEVOTHYROXINE SODIUM 125 MCG
1 TABLET ORAL
Qty: 0 | Refills: 0 | DISCHARGE

## 2022-08-02 RX ORDER — SIMVASTATIN 20 MG/1
1 TABLET, FILM COATED ORAL
Qty: 0 | Refills: 0 | DISCHARGE
Start: 2022-08-02

## 2022-08-02 RX ORDER — METOPROLOL TARTRATE 50 MG
1 TABLET ORAL
Qty: 0 | Refills: 0 | DISCHARGE

## 2022-08-02 RX ORDER — LEVOTHYROXINE SODIUM 125 MCG
1 TABLET ORAL
Qty: 0 | Refills: 0 | DISCHARGE
Start: 2022-08-02

## 2022-08-02 RX ORDER — SENNA PLUS 8.6 MG/1
2 TABLET ORAL
Qty: 0 | Refills: 0 | DISCHARGE
Start: 2022-08-02

## 2022-08-02 RX ADMIN — LOSARTAN POTASSIUM 50 MILLIGRAM(S): 100 TABLET, FILM COATED ORAL at 11:51

## 2022-08-02 RX ADMIN — LEVETIRACETAM 500 MILLIGRAM(S): 250 TABLET, FILM COATED ORAL at 05:42

## 2022-08-02 RX ADMIN — HEPARIN SODIUM 5000 UNIT(S): 5000 INJECTION INTRAVENOUS; SUBCUTANEOUS at 05:42

## 2022-08-02 RX ADMIN — Medication 75 MICROGRAM(S): at 05:42

## 2022-08-02 RX ADMIN — Medication 650 MILLIGRAM(S): at 00:49

## 2022-08-02 RX ADMIN — TRAMADOL HYDROCHLORIDE 25 MILLIGRAM(S): 50 TABLET ORAL at 03:46

## 2022-08-02 RX ADMIN — Medication 500000 UNIT(S): at 00:20

## 2022-08-02 RX ADMIN — TRAMADOL HYDROCHLORIDE 25 MILLIGRAM(S): 50 TABLET ORAL at 04:26

## 2022-08-02 RX ADMIN — Medication 2: at 11:45

## 2022-08-02 RX ADMIN — Medication 500000 UNIT(S): at 05:42

## 2022-08-02 RX ADMIN — Medication 500000 UNIT(S): at 11:46

## 2022-08-02 RX ADMIN — Medication 650 MILLIGRAM(S): at 00:19

## 2022-08-02 RX ADMIN — Medication 1: at 08:17

## 2022-08-02 RX ADMIN — Medication 50 MILLIGRAM(S): at 11:50

## 2022-08-02 NOTE — DISCHARGE NOTE PROVIDER - NSDCMRMEDTOKEN_GEN_ALL_CORE_FT
heparin: 5000 unit(s) subcutaneous 2 times a day  insulin lispro 100 units/mL injectable solution: 1 Unit(s) if Glucose 151 - 200  2 Unit(s) if Glucose 201 - 250  3 Unit(s) if Glucose 251 - 300  4 Unit(s) if Glucose 301 - 350  5 Unit(s) if Glucose 351 - 400  6 Unit(s) if Glucose Greater Than 400      levETIRAcetam 500 mg oral tablet: 1 tab(s) orally 2 times a day  levothyroxine 75 mcg (0.075 mg) oral tablet: 1 tab(s) orally once a day  losartan 50 mg oral tablet: 1 tab(s) orally once a day  metoprolol succinate 50 mg oral tablet, extended release: 1 tab(s) orally once a day  nystatin 100,000 units/mL oral suspension: 5 milliliter(s) orally every 6 hours  senna leaf extract oral tablet: 2 tab(s) orally once a day (at bedtime), As needed, Constipation  simvastatin 20 mg oral tablet: 1 tab(s) orally once a day (at bedtime)

## 2022-08-02 NOTE — DISCHARGE NOTE PROVIDER - HOSPITAL COURSE
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 No acute intracranial abnormality on repeat CTH aside from expected evolution of L frontal IPH. Admitted for PT/OT evaluation.     Impression: Subacute superior left parasagittal frontal lobe IPH, secondary to HTN    07/30/22:   CT HEAD: Redemonstrated subacute intraparenchymal hemorrhage in the left frontal   lobe has decreased in size since the prior study. No new acute findings.     CTA BRAIN: Patent intracranial circulation. No flow-limiting stenosis or   occlusion.    CTA NECK: Hypoplastic left vertebral artery.    MRI:     Will need repeat MRI with contrast in 4-6 weeks (8/27-9/10/2022)    TTE EF 60-65% .TTE exam terminated prematurely at the patients request. Interpretation is based on parasternal and limited apical views, can have repeat TTE as outpatient,  cardiac monitoring without reported events.           [facility] Duplex LE: No evidence of deep venous thrombosis in either lower extremity.      Continue tramadol for pain.     Physical therapy/OT recommends [facility]. Stable for discharge.

## 2022-08-02 NOTE — PROGRESS NOTE ADULT - ASSESSMENT
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 No acute intracranial abnormality on repeat CTH aside from expected evolution of L frontal IPH. Admitted for PT/OT evaluation.     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     NEURO: Neurologically without change, Continue  monitoring for neurologic deterioration, SBP < 160 ,MRI Brain w/  pending. Physical therapy/OT recommends [facility].    ANTITHROMBOTIC THERAPY: none due to IPH    PULMONARY: CXR clear, protecting airway, saturating well     CARDIOVASCULAR: TTE EF 60-65% .TTE exam terminated prematurely at the patients request.Interpretation is based on parasternal and limited apical views, can have repeat TTE as outpatient,  cardiac monitoring without reported events.                             SBP goal: <160    GASTROINTESTINAL:  dysphagia screen- passed, tolerating diet      Diet: Regular    RENAL: BUN/Cr improved, previously with SURJIT      Na Goal: Greater than 135     Rivero: N    HEMATOLOGY: H/H stable, Platelets 310, VA Duplex LE: No evidence of deep venous thrombosis in either lower extremity.      DVT ppx: heparin subq    ID: afebrile, no leukocytosis     OTHER: Plan of care discussed with family at bedside. Continue tramadol for pain.     DISPOSITION: [facility] once bed available. Medically cleared.      CORE MEASURES:        Admission NIHSS: 16     TPA: [] YES [x] NO      LDL/HDL: P     Depression Screen: 0      Statin Therapy: Y     Dysphagia Screen: [x] PASS [] FAIL     Smoking [] YES [] NO      Afib [] YES [x] NO     Stroke Education [x] YES [] NO    Obtain screening lower extremity venous ultrasound in patients who meet 1 or more of the following criteria as patient is high risk for DVT/PE on admission:   [] History of DVT/PE  []Hypercoagulable states (Factor V Leiden, Cancer, OCP, etc. )  [x]Prolonged immobility (hemiplegia/hemiparesis/post operative or any other extended immobilization)  [] Transferred from outside facility (Rehab or Long term care)  [] Age </= to 50.   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 No acute intracranial abnormality on repeat CTH aside from expected evolution of L frontal IPH. Admitted for PT/OT evaluation.     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     NEURO: Neurologically without change, Continue  monitoring for neurologic deterioration, SBP < 160 ,MRI Brain performed- f/u official read. Physical therapy/OT recommends [facility].    ANTITHROMBOTIC THERAPY: none due to IPH    PULMONARY: CXR clear, protecting airway, saturating well     CARDIOVASCULAR: TTE EF 60-65% .TTE exam terminated prematurely at the patients request.Interpretation is based on parasternal and limited apical views, can have repeat TTE as outpatient,  cardiac monitoring without reported events.                             SBP goal: <160    GASTROINTESTINAL:  dysphagia screen- passed, tolerating diet      Diet: Regular    RENAL: BUN/Cr improved, previously with SURJIT      Na Goal: Greater than 135     Rivero: N    HEMATOLOGY: H/H stable, Platelets 296, VA Duplex LE: No evidence of deep venous thrombosis in either lower extremity.      DVT ppx: heparin subq    ID: afebrile, no leukocytosis     OTHER: Plan of care discussed with family at bedside. Continue tramadol for pain.     DISPOSITION: [facility] once bed available. Medically cleared.      CORE MEASURES:        Admission NIHSS: 16     TPA: [] YES [x] NO      LDL/HDL: P     Depression Screen: 0      Statin Therapy: Y     Dysphagia Screen: [x] PASS [] FAIL     Smoking [] YES [] NO      Afib [] YES [x] NO     Stroke Education [x] YES [] NO    Obtain screening lower extremity venous ultrasound in patients who meet 1 or more of the following criteria as patient is high risk for DVT/PE on admission:   [] History of DVT/PE  []Hypercoagulable states (Factor V Leiden, Cancer, OCP, etc. )  [x]Prolonged immobility (hemiplegia/hemiparesis/post operative or any other extended immobilization)  [] Transferred from outside facility (Rehab or Long term care)  [] Age </= to 50.   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 No acute intracranial abnormality on repeat CTH aside from expected evolution of L frontal IPH. Admitted for PT/OT evaluation.     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     NEURO: Neurologically without change, Continue  monitoring for neurologic deterioration, SBP < 160 ,MRI Brain performed- f/u official read. Recommend repeat MRI w/wo IV Cont in 4-6 weeks (8/27-9/10/2022). Physical therapy/OT recommends [facility].    ANTITHROMBOTIC THERAPY: none due to IPH    PULMONARY: CXR clear, protecting airway, saturating well     CARDIOVASCULAR: TTE EF 60-65% .TTE exam terminated prematurely at the patients request. Interpretation is based on parasternal and limited apical views, can have repeat TTE as outpatient,  cardiac monitoring without reported events.                             SBP goal: <160    GASTROINTESTINAL:  dysphagia screen- passed, tolerating diet      Diet: Regular    RENAL: BUN/Cr improved, previously with SURJIT      Na Goal: Greater than 135     Rivero: N    HEMATOLOGY: H/H stable, Platelets 296, VA Duplex LE: No evidence of deep venous thrombosis in either lower extremity.      DVT ppx: heparin subq    ID: afebrile, no leukocytosis     OTHER: Plan of care discussed with family at bedside. Continue tramadol for pain.     DISPOSITION: [facility] once bed available. Medically cleared.      CORE MEASURES:        Admission NIHSS: 16     TPA: [] YES [x] NO      LDL/HDL: P     Depression Screen: 0      Statin Therapy: Y     Dysphagia Screen: [x] PASS [] FAIL     Smoking [] YES [] NO      Afib [] YES [x] NO     Stroke Education [x] YES [] NO    Obtain screening lower extremity venous ultrasound in patients who meet 1 or more of the following criteria as patient is high risk for DVT/PE on admission:   [] History of DVT/PE  []Hypercoagulable states (Factor V Leiden, Cancer, OCP, etc. )  [x]Prolonged immobility (hemiplegia/hemiparesis/post operative or any other extended immobilization)  [] Transferred from outside facility (Rehab or Long term care)  [] Age </= to 50.

## 2022-08-02 NOTE — PROGRESS NOTE ADULT - SUBJECTIVE AND OBJECTIVE BOX
THE PATIENT WAS SEEN AND EXAMINED BY ME WITH THE HOUSESTAFF AND STROKE TEAM DURING MORNING ROUNDS.   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 She was evaluated in the [facility] ED on 7/21 and discharged home after repeat stable CTH. She presents today due to worsening R hemiparesis and slowed responsiveness first noted by her daughter around 7pm on 7/29. LKW unclear given baseline weakness but definitely worse since yesterday per daughter. The patient is also talking less and is slower to respond. Patient denies current headache, vision changes, dizziness, numbness/tingling, or difficulty speaking. Not on AC/AP.     SUBJECTIVE: Noted with generalized body pain improved with pain medications. No new neurologic complaints.  ROS reported negative unless otherwise noted.    dextrose 5%. 1000 milliLiter(s) IV Continuous <Continuous>  dextrose 5%. 1000 milliLiter(s) IV Continuous <Continuous>  dextrose 50% Injectable 25 Gram(s) IV Push once  dextrose 50% Injectable 12.5 Gram(s) IV Push once  dextrose 50% Injectable 25 Gram(s) IV Push once  dextrose Oral Gel 15 Gram(s) Oral once PRN  glucagon  Injectable 1 milliGRAM(s) IntraMuscular once  heparin   Injectable 5000 Unit(s) SubCutaneous every 12 hours  insulin lispro (ADMELOG) corrective regimen sliding scale   SubCutaneous three times a day before meals  insulin lispro (ADMELOG) corrective regimen sliding scale   SubCutaneous at bedtime  levETIRAcetam 500 milliGRAM(s) Oral two times a day  levothyroxine 75 MICROGram(s) Oral daily  losartan 50 milliGRAM(s) Oral daily  metoprolol succinate ER 50 milliGRAM(s) Oral daily  nystatin    Suspension 748280 Unit(s) Oral every 6 hours  senna 2 Tablet(s) Oral at bedtime PRN  simvastatin 20 milliGRAM(s) Oral at bedtime  sodium chloride 0.9%. 1000 milliLiter(s) IV Continuous <Continuous>      PHYSICAL EXAM:   Vital Signs Last 24 Hrs  T(C): 36.4 (02 Aug 2022 05:13), Max: 37.1 (01 Aug 2022 08:49)  T(F): 97.6 (02 Aug 2022 05:13), Max: 98.7 (01 Aug 2022 08:49)  HR: 79 (02 Aug 2022 05:13) (79 - 119)  BP: 104/67 (02 Aug 2022 05:13) (104/67 - 138/84)  BP(mean): --  RR: 18 (02 Aug 2022 05:13) (16 - 18)  SpO2: 95% (02 Aug 2022 05:13) (92% - 98%)    Parameters below as of 02 Aug 2022 00:34  Patient On (Oxygen Delivery Method): room air       ID:   General: No acute distress  HEENT: EOM intact, visual fields full  Abdomen: Soft, nontender, nondistended   Extremities: No edema    NEUROLOGICAL EXAM:  Mental status: Awake, alert, oriented x2, speech fluent, follows commands  Cranial Nerves: mild right facial palsy, no nystagmus, no dysarthria,  tongue midline  Motor exam:  5/5 RUE, 2/5 RLE, LUE/LLE plegia and spasticity at baseline  Sensation: Intact to light touch   Coordination/ Gait: No dysmetria, gait not assessed    LABS:                        14.2   11.89 )-----------( 310      ( 01 Aug 2022 06:38 )             44.1    08-02    136  |  104  |  33<H>  ----------------------------<  173<H>  4.5   |  18<L>  |  1.32<H>    Ca    8.9      02 Aug 2022 05:55    IMAGING: Reviewed by me.     07/30/22:   CT HEAD: Redemonstrated subacute intraparenchymal hemorrhage in the left frontal   lobe has decreased in size since the prior study. No new acute findings.     CTA BRAIN: Patent intracranial circulation. No flow-limiting stenosis or   occlusion.    CTA NECK: Hypoplastic left vertebral artery.   THE PATIENT WAS SEEN AND EXAMINED BY ME WITH THE HOUSESTAFF AND STROKE TEAM DURING MORNING ROUNDS.   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 She was evaluated in the [facility] ED on 7/21 and discharged home after repeat stable CTH. She presents today due to worsening R hemiparesis and slowed responsiveness first noted by her daughter around 7pm on 7/29. LKW unclear given baseline weakness but definitely worse since yesterday per daughter. The patient is also talking less and is slower to respond. Patient denies current headache, vision changes, dizziness, numbness/tingling, or difficulty speaking. Not on AC/AP.     SUBJECTIVE: Noted with generalized body pain improved with pain medications. No new neurologic complaints.  ROS reported negative unless otherwise noted.     ID # [id number]    dextrose 5%. 1000 milliLiter(s) IV Continuous <Continuous>  dextrose 5%. 1000 milliLiter(s) IV Continuous <Continuous>  dextrose 50% Injectable 25 Gram(s) IV Push once  dextrose 50% Injectable 12.5 Gram(s) IV Push once  dextrose 50% Injectable 25 Gram(s) IV Push once  dextrose Oral Gel 15 Gram(s) Oral once PRN  glucagon  Injectable 1 milliGRAM(s) IntraMuscular once  heparin   Injectable 5000 Unit(s) SubCutaneous every 12 hours  insulin lispro (ADMELOG) corrective regimen sliding scale   SubCutaneous three times a day before meals  insulin lispro (ADMELOG) corrective regimen sliding scale   SubCutaneous at bedtime  levETIRAcetam 500 milliGRAM(s) Oral two times a day  levothyroxine 75 MICROGram(s) Oral daily  losartan 50 milliGRAM(s) Oral daily  metoprolol succinate ER 50 milliGRAM(s) Oral daily  nystatin    Suspension 181826 Unit(s) Oral every 6 hours  senna 2 Tablet(s) Oral at bedtime PRN  simvastatin 20 milliGRAM(s) Oral at bedtime  sodium chloride 0.9%. 1000 milliLiter(s) IV Continuous <Continuous>      PHYSICAL EXAM:   Vital Signs Last 24 Hrs  T(C): 36.4 (02 Aug 2022 05:13), Max: 37.1 (01 Aug 2022 08:49)  T(F): 97.6 (02 Aug 2022 05:13), Max: 98.7 (01 Aug 2022 08:49)  HR: 79 (02 Aug 2022 05:13) (79 - 119)  BP: 104/67 (02 Aug 2022 05:13) (104/67 - 138/84)  BP(mean): --  RR: 18 (02 Aug 2022 05:13) (16 - 18)  SpO2: 95% (02 Aug 2022 05:13) (92% - 98%)    Parameters below as of 02 Aug 2022 00:34  Patient On (Oxygen Delivery Method): room air       ID:   General: No acute distress  HEENT: EOM intact, visual fields full  Abdomen: Soft, nontender, nondistended   Extremities: No edema    NEUROLOGICAL EXAM:  Mental status: Awake, alert, oriented x2, speech fluent, follows commands  Cranial Nerves: mild right facial palsy, no nystagmus, no dysarthria,  tongue midline  Motor exam:  5/5 RUE, 2/5 RLE, LUE/LLE plegia and spasticity at baseline  Sensation: Intact to light touch   Coordination/ Gait: No dysmetria, gait not assessed    LABS:                        14.2   11.89 )-----------( 310      ( 01 Aug 2022 06:38 )             44.1    08-02    136  |  104  |  33<H>  ----------------------------<  173<H>  4.5   |  18<L>  |  1.32<H>    Ca    8.9      02 Aug 2022 05:55    IMAGING: Reviewed by me.     07/30/22:   CT HEAD: Redemonstrated subacute intraparenchymal hemorrhage in the left frontal   lobe has decreased in size since the prior study. No new acute findings.     CTA BRAIN: Patent intracranial circulation. No flow-limiting stenosis or   occlusion.    CTA NECK: Hypoplastic left vertebral artery.

## 2022-08-02 NOTE — DISCHARGE NOTE NURSING/CASE MANAGEMENT/SOCIAL WORK - PATIENT PORTAL LINK FT
You can access the FollowMyHealth Patient Portal offered by [facility] by registering at the following website: [email or web address]. By joining [facility]’s FollowMyHealth portal, you will also be able to view your health information using other applications (apps) compatible with our system.

## 2022-08-02 NOTE — DISCHARGE NOTE PROVIDER - NSDCCPCAREPLAN_GEN_ALL_CORE_FT
PRINCIPAL DISCHARGE DIAGNOSIS  Diagnosis: ICH (intracerebral hemorrhage)  Assessment and Plan of Treatment: Please follow up with neurologist in 1 week. The office will call you to schedule an appointment, if you do not hear from them please call [phone]. Continue taking medications as prescribed. If you were prescribed a statin for your cholesterol please make sure you have your liver enzymes checked with your primary care physician. Monitor your blood pressure. Reduce fat, cholesterol and salt in your diet. Increase intake of fruits and vegetables. Limit alcohol to minimum and do not smoke. You may be at risk for falling, make changes to your home to help you walk easier. Keep up to date on vaccinations.  If you experience any symptoms of facial drooping, slurred speech, arm or leg weakness, severe headache, vision changes or any worsening symptoms, notify provider immediately and return to ER.

## 2022-08-02 NOTE — DISCHARGE NOTE NURSING/CASE MANAGEMENT/SOCIAL WORK - NSDCPEFALRISK_GEN_ALL_CORE
For information on Fall & Injury Prevention, visit: [email or web address] OR  [email or web address] OR  https://www.cdc.gov/steadi/patient.html

## 2022-08-02 NOTE — DISCHARGE NOTE PROVIDER - CARE PROVIDER_API CALL
Ferny Sy)  Neurology; Vascular Neurology  [address], [address]  [place] [address]  Phone: [phone]  Fax: [phone]  Follow Up Time: 2 weeks

## 2022-08-04 LAB
CULTURE RESULTS: SIGNIFICANT CHANGE UP
CULTURE RESULTS: SIGNIFICANT CHANGE UP
SPECIMEN SOURCE: SIGNIFICANT CHANGE UP
SPECIMEN SOURCE: SIGNIFICANT CHANGE UP

## 2022-08-26 ENCOUNTER — APPOINTMENT (OUTPATIENT)
Dept: CT IMAGING | Facility: CLINIC | Age: 82
End: 2022-08-26

## 2022-08-28 ENCOUNTER — INPATIENT (INPATIENT)
Facility: HOSPITAL | Age: 82
LOS: 1 days | Discharge: NURSING, REHAB OR LONG-TERM CARE | DRG: 689 | End: 2022-08-30
Attending: INTERNAL MEDICINE | Admitting: INTERNAL MEDICINE
Payer: MEDICARE

## 2022-08-28 VITALS
HEIGHT: 60 IN | OXYGEN SATURATION: 96 % | HEART RATE: 82 BPM | SYSTOLIC BLOOD PRESSURE: 126 MMHG | DIASTOLIC BLOOD PRESSURE: 75 MMHG | RESPIRATION RATE: 16 BRPM

## 2022-08-28 DIAGNOSIS — N39.0 URINARY TRACT INFECTION, SITE NOT SPECIFIED: ICD-10-CM

## 2022-08-28 LAB
ALBUMIN SERPL ELPH-MCNC: 4 G/DL — SIGNIFICANT CHANGE UP (ref 3.3–5)
ALBUMIN SERPL ELPH-MCNC: 4.1 G/DL — SIGNIFICANT CHANGE UP (ref 3.3–5)
ALP SERPL-CCNC: 103 U/L — SIGNIFICANT CHANGE UP (ref 40–120)
ALP SERPL-CCNC: 115 U/L — SIGNIFICANT CHANGE UP (ref 40–120)
ALT FLD-CCNC: 42 U/L — SIGNIFICANT CHANGE UP (ref 10–45)
ALT FLD-CCNC: 82 U/L — HIGH (ref 10–45)
ANION GAP SERPL CALC-SCNC: 13 MMOL/L — SIGNIFICANT CHANGE UP (ref 5–17)
ANION GAP SERPL CALC-SCNC: 7 MMOL/L — SIGNIFICANT CHANGE UP (ref 5–17)
APPEARANCE UR: ABNORMAL
AST SERPL-CCNC: 133 U/L — HIGH (ref 10–40)
AST SERPL-CCNC: 32 U/L — SIGNIFICANT CHANGE UP (ref 10–40)
BACTERIA # UR AUTO: ABNORMAL
BASE EXCESS BLDV CALC-SCNC: -1.3 MMOL/L — SIGNIFICANT CHANGE UP (ref -2–3)
BASOPHILS # BLD AUTO: 0.04 K/UL — SIGNIFICANT CHANGE UP (ref 0–0.2)
BASOPHILS NFR BLD AUTO: 0.5 % — SIGNIFICANT CHANGE UP (ref 0–2)
BILIRUB SERPL-MCNC: 0.2 MG/DL — SIGNIFICANT CHANGE UP (ref 0.2–1.2)
BILIRUB SERPL-MCNC: 0.3 MG/DL — SIGNIFICANT CHANGE UP (ref 0.2–1.2)
BILIRUB UR-MCNC: NEGATIVE — SIGNIFICANT CHANGE UP
BUN SERPL-MCNC: 23 MG/DL — SIGNIFICANT CHANGE UP (ref 7–23)
BUN SERPL-MCNC: 23 MG/DL — SIGNIFICANT CHANGE UP (ref 7–23)
CA-I SERPL-SCNC: 1.27 MMOL/L — SIGNIFICANT CHANGE UP (ref 1.15–1.33)
CALCIUM SERPL-MCNC: 9.6 MG/DL — SIGNIFICANT CHANGE UP (ref 8.4–10.5)
CALCIUM SERPL-MCNC: 9.7 MG/DL — SIGNIFICANT CHANGE UP (ref 8.4–10.5)
CHLORIDE BLDV-SCNC: 104 MMOL/L — SIGNIFICANT CHANGE UP (ref 96–108)
CHLORIDE SERPL-SCNC: 103 MMOL/L — SIGNIFICANT CHANGE UP (ref 96–108)
CHLORIDE SERPL-SCNC: 105 MMOL/L — SIGNIFICANT CHANGE UP (ref 96–108)
CO2 BLDV-SCNC: 25 MMOL/L — SIGNIFICANT CHANGE UP (ref 22–26)
CO2 SERPL-SCNC: 22 MMOL/L — SIGNIFICANT CHANGE UP (ref 22–31)
CO2 SERPL-SCNC: 22 MMOL/L — SIGNIFICANT CHANGE UP (ref 22–31)
COLOR SPEC: ABNORMAL
CREAT SERPL-MCNC: 1.44 MG/DL — HIGH (ref 0.5–1.3)
CREAT SERPL-MCNC: 1.5 MG/DL — HIGH (ref 0.5–1.3)
DIFF PNL FLD: ABNORMAL
EGFR: 35 ML/MIN/1.73M2 — LOW
EGFR: 37 ML/MIN/1.73M2 — LOW
EOSINOPHIL # BLD AUTO: 0.27 K/UL — SIGNIFICANT CHANGE UP (ref 0–0.5)
EOSINOPHIL NFR BLD AUTO: 3.2 % — SIGNIFICANT CHANGE UP (ref 0–6)
EPI CELLS # UR: 3 — SIGNIFICANT CHANGE UP
GAS PNL BLDV: 137 MMOL/L — SIGNIFICANT CHANGE UP (ref 136–145)
GAS PNL BLDV: SIGNIFICANT CHANGE UP
GAS PNL BLDV: SIGNIFICANT CHANGE UP
GLUCOSE BLDV-MCNC: 139 MG/DL — HIGH (ref 70–99)
GLUCOSE SERPL-MCNC: 138 MG/DL — HIGH (ref 70–99)
GLUCOSE SERPL-MCNC: 143 MG/DL — HIGH (ref 70–99)
GLUCOSE UR QL: ABNORMAL
HCO3 BLDV-SCNC: 24 MMOL/L — SIGNIFICANT CHANGE UP (ref 22–29)
HCT VFR BLD CALC: 45.9 % — HIGH (ref 34.5–45)
HCT VFR BLDA CALC: 40 % — SIGNIFICANT CHANGE UP (ref 34.5–46.5)
HGB BLD CALC-MCNC: 13.2 G/DL — SIGNIFICANT CHANGE UP (ref 11.7–16.1)
HGB BLD-MCNC: 14.2 G/DL — SIGNIFICANT CHANGE UP (ref 11.5–15.5)
HYALINE CASTS # UR AUTO: 6 /LPF — SIGNIFICANT CHANGE UP (ref 0–7)
IMM GRANULOCYTES NFR BLD AUTO: 0.6 % — SIGNIFICANT CHANGE UP (ref 0–1.5)
KETONES UR-MCNC: NEGATIVE — SIGNIFICANT CHANGE UP
LACTATE BLDV-MCNC: 0.9 MMOL/L — SIGNIFICANT CHANGE UP (ref 0.5–2)
LEUKOCYTE ESTERASE UR-ACNC: ABNORMAL
LYMPHOCYTES # BLD AUTO: 3.32 K/UL — HIGH (ref 1–3.3)
LYMPHOCYTES # BLD AUTO: 39 % — SIGNIFICANT CHANGE UP (ref 13–44)
MCHC RBC-ENTMCNC: 28.4 PG — SIGNIFICANT CHANGE UP (ref 27–34)
MCHC RBC-ENTMCNC: 30.9 GM/DL — LOW (ref 32–36)
MCV RBC AUTO: 91.8 FL — SIGNIFICANT CHANGE UP (ref 80–100)
MONOCYTES # BLD AUTO: 0.68 K/UL — SIGNIFICANT CHANGE UP (ref 0–0.9)
MONOCYTES NFR BLD AUTO: 8 % — SIGNIFICANT CHANGE UP (ref 2–14)
NEUTROPHILS # BLD AUTO: 4.15 K/UL — SIGNIFICANT CHANGE UP (ref 1.8–7.4)
NEUTROPHILS NFR BLD AUTO: 48.7 % — SIGNIFICANT CHANGE UP (ref 43–77)
NITRITE UR-MCNC: POSITIVE
NRBC # BLD: 0 /100 WBCS — SIGNIFICANT CHANGE UP (ref 0–0)
PCO2 BLDV: 40 MMHG — SIGNIFICANT CHANGE UP (ref 39–42)
PH BLDV: 7.38 — SIGNIFICANT CHANGE UP (ref 7.32–7.43)
PH UR: 6 — SIGNIFICANT CHANGE UP (ref 5–8)
PLATELET # BLD AUTO: 274 K/UL — SIGNIFICANT CHANGE UP (ref 150–400)
PO2 BLDV: 69 MMHG — HIGH (ref 25–45)
POTASSIUM BLDV-SCNC: 4.4 MMOL/L — SIGNIFICANT CHANGE UP (ref 3.5–5.1)
POTASSIUM SERPL-MCNC: 4.5 MMOL/L — SIGNIFICANT CHANGE UP (ref 3.5–5.3)
POTASSIUM SERPL-MCNC: >9 MMOL/L — CRITICAL HIGH (ref 3.5–5.3)
POTASSIUM SERPL-SCNC: 4.5 MMOL/L — SIGNIFICANT CHANGE UP (ref 3.5–5.3)
POTASSIUM SERPL-SCNC: >9 MMOL/L — CRITICAL HIGH (ref 3.5–5.3)
PROT SERPL-MCNC: 7.5 G/DL — SIGNIFICANT CHANGE UP (ref 6–8.3)
PROT SERPL-MCNC: 8.7 G/DL — HIGH (ref 6–8.3)
PROT UR-MCNC: 100 — SIGNIFICANT CHANGE UP
RBC # BLD: 5 M/UL — SIGNIFICANT CHANGE UP (ref 3.8–5.2)
RBC # FLD: 14.3 % — SIGNIFICANT CHANGE UP (ref 10.3–14.5)
RBC CASTS # UR COMP ASSIST: 10 /HPF — HIGH (ref 0–4)
SAO2 % BLDV: 93.9 % — HIGH (ref 67–88)
SODIUM SERPL-SCNC: 132 MMOL/L — LOW (ref 135–145)
SODIUM SERPL-SCNC: 140 MMOL/L — SIGNIFICANT CHANGE UP (ref 135–145)
SP GR SPEC: 1.02 — SIGNIFICANT CHANGE UP (ref 1.01–1.02)
UROBILINOGEN FLD QL: NEGATIVE — SIGNIFICANT CHANGE UP
WBC # BLD: 8.51 K/UL — SIGNIFICANT CHANGE UP (ref 3.8–10.5)
WBC # FLD AUTO: 8.51 K/UL — SIGNIFICANT CHANGE UP (ref 3.8–10.5)
WBC UR QL: >50

## 2022-08-28 PROCEDURE — 70496 CT ANGIOGRAPHY HEAD: CPT | Mod: 26,MA

## 2022-08-28 PROCEDURE — 71045 X-RAY EXAM CHEST 1 VIEW: CPT | Mod: 26

## 2022-08-28 PROCEDURE — 99285 EMERGENCY DEPT VISIT HI MDM: CPT | Mod: GC

## 2022-08-28 RX ORDER — METOPROLOL TARTRATE 50 MG
50 TABLET ORAL DAILY
Refills: 0 | Status: DISCONTINUED | OUTPATIENT
Start: 2022-08-28 | End: 2022-08-30

## 2022-08-28 RX ORDER — LEVETIRACETAM 250 MG/1
500 TABLET, FILM COATED ORAL
Refills: 0 | Status: DISCONTINUED | OUTPATIENT
Start: 2022-08-28 | End: 2022-08-30

## 2022-08-28 RX ORDER — SODIUM CHLORIDE 9 MG/ML
1000 INJECTION, SOLUTION INTRAVENOUS
Refills: 0 | Status: DISCONTINUED | OUTPATIENT
Start: 2022-08-28 | End: 2022-08-30

## 2022-08-28 RX ORDER — CEFTRIAXONE 500 MG/1
1000 INJECTION, POWDER, FOR SOLUTION INTRAMUSCULAR; INTRAVENOUS EVERY 24 HOURS
Refills: 0 | Status: DISCONTINUED | OUTPATIENT
Start: 2022-08-28 | End: 2022-08-29

## 2022-08-28 RX ORDER — RIVAROXABAN 15 MG-20MG
10 KIT ORAL DAILY
Refills: 0 | Status: DISCONTINUED | OUTPATIENT
Start: 2022-08-28 | End: 2022-08-30

## 2022-08-28 RX ORDER — LANOLIN ALCOHOL/MO/W.PET/CERES
5 CREAM (GRAM) TOPICAL AT BEDTIME
Refills: 0 | Status: DISCONTINUED | OUTPATIENT
Start: 2022-08-28 | End: 2022-08-30

## 2022-08-28 RX ORDER — CEFTRIAXONE 500 MG/1
1000 INJECTION, POWDER, FOR SOLUTION INTRAMUSCULAR; INTRAVENOUS ONCE
Refills: 0 | Status: COMPLETED | OUTPATIENT
Start: 2022-08-28 | End: 2022-08-28

## 2022-08-28 RX ORDER — DIVALPROEX SODIUM 500 MG/1
125 TABLET, DELAYED RELEASE ORAL EVERY 6 HOURS
Refills: 0 | Status: DISCONTINUED | OUTPATIENT
Start: 2022-08-28 | End: 2022-08-30

## 2022-08-28 RX ORDER — LEVOTHYROXINE SODIUM 125 MCG
75 TABLET ORAL DAILY
Refills: 0 | Status: DISCONTINUED | OUTPATIENT
Start: 2022-08-28 | End: 2022-08-30

## 2022-08-28 RX ORDER — SENNA PLUS 8.6 MG/1
2 TABLET ORAL AT BEDTIME
Refills: 0 | Status: DISCONTINUED | OUTPATIENT
Start: 2022-08-28 | End: 2022-08-30

## 2022-08-28 RX ORDER — SIMVASTATIN 20 MG/1
20 TABLET, FILM COATED ORAL AT BEDTIME
Refills: 0 | Status: DISCONTINUED | OUTPATIENT
Start: 2022-08-28 | End: 2022-08-30

## 2022-08-28 RX ORDER — LOSARTAN POTASSIUM 100 MG/1
50 TABLET, FILM COATED ORAL DAILY
Refills: 0 | Status: DISCONTINUED | OUTPATIENT
Start: 2022-08-28 | End: 2022-08-30

## 2022-08-28 RX ADMIN — CEFTRIAXONE 100 MILLIGRAM(S): 500 INJECTION, POWDER, FOR SOLUTION INTRAMUSCULAR; INTRAVENOUS at 19:40

## 2022-08-28 RX ADMIN — SODIUM CHLORIDE 75 MILLILITER(S): 9 INJECTION, SOLUTION INTRAVENOUS at 22:04

## 2022-08-28 RX ADMIN — RIVAROXABAN 10 MILLIGRAM(S): KIT at 22:03

## 2022-08-28 NOTE — ED PROVIDER NOTE - CLINICAL SUMMARY MEDICAL DECISION MAKING FREE TEXT BOX
Patient is a 82y female who presents to the ED with altered mental status. According to family and aide her last known normal was around 10pm, last night. She is normally A&OX3. She is currently unable to speak. Patient's family is aware of plan to rule out any infectious etiologies and any cranial etiologies at this moment. Given the recent increase in medication, they believe this is the cause. According to family, if everything is "normal", they request she be transferred back to the facility.

## 2022-08-28 NOTE — ED PROVIDER NOTE - NS ED ROS FT
ROS per family members   CONSTITUTIONAL: No fevers, no chillss  EYES: no visual changes, no eye pain  CV: No chest pain, no palpitations  RESP: No SOB, no cough  GI: No n/v/d, no abd pain  : no dysuria, no hematuria,  SKIN: no rashes  NEURO: no headache, hx of stroke and one sided paralysis.

## 2022-08-28 NOTE — CONSULT NOTE ADULT - SUBJECTIVE AND OBJECTIVE BOX
Neurology - Consult Note - 08-28-22     Name: JOSIE HUGHES; 82y (1940)    Reason for Admission:     Chief Complaint: 81 yo F with AMS    HPI: History obtained from family over phone as patient unable to provide history despite use of  services (#[id number]). 81 yo F, HTN, HLD, prior stroke R side bleed 10 years ago with residual L deficits, L shoulder pain from 10 years ago with contracted arm per family, Left frontal bleed 4 weeks ago, COVID (finished quarantine 8 days ago),  presents to the ED for altered mentation since Friday night. Patient's family visited her at rehab yesterday around 4pm. At that time, she had no problems communicating. Her day caregiver went home. Her night caregiver noted patient slept through the night without waking up, which was unusual for her. In the morning, patient was difficult to wake and deep asleep. Per family, patient did have a substantial drop in her BP this morning, stating it had dropped to the ?60s. She was sent her from her rehab facility to get a CT scan. Family reported that patient had received an overdose of oxycodone at the facility earlier last week, and had similar presentation at that time. They also noted that her gabapentin dose was upped from 50 to 100 on Friday, and that Saturday morning, she started feeling some side effects. Family states that patient is very sensitive to any changes in her medications. Family insisting that if lab workup and imaging results do not show anything cardiac or neurological that she be sent back to the facility to recuperate there as they worry about the change in environment worsening her delirium.      Review of Systems: cannot obtain given patient's mentation. Patient had not been complaining of anything per family. No fevers per family.    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  L frontal bleed 4 weeks ago    PFHx:     PSuHx:       Medications:  MEDICATIONS  (STANDING):  cefTRIAXone   IVPB 1000 milliGRAM(s) IV Intermittent once    MEDICATIONS  (PRN):      Vitals:  T(C): --  HR: 82 (08-28-22 @ 13:44) (82 - 82)  BP: 126/75 (08-28-22 @ 13:44) (126/75 - 126/75)  RR: 16 (08-28-22 @ 13:44) (16 - 16)  SpO2: 96% (08-28-22 @ 13:44) (96% - 96%)    Physical Examination:  Constitutional: Female, appears stated age, is yelling when approached  Head: Normocephalic; Eyes: clear sclera;   Extremities: No cyanosis; Skin: No karlee edema of LE  Resp: breathing comfortably     Neurological (>12):  MS: Awake, alert.  Does not follow commands. Yells out when she is touched or asked a question  Language: Cannot assess as patient refuses to answer any questions or follow any commands  CNs: PERRL. VFF to threat. EOMI grossly, no obvious gaze preference.  Tongue midline.     Motor: Left extremity flexed, contracted at baseline. Patient resists passive elevation or movement of R arm or leg, screams out when touched.     Sensation: Intact to LT R upper and lower extremity. Not withdrawing on the left side. Continuously yelling so difficult to assess if pain or no pain.    Reflexes R/L: RLE 3+, clonus present. LLE 1+. RUE 2+, LUE 3+  Coordination: cannot assess  Gait: cannot assess        Labs:                        14.2   8.51  )-----------( 274      ( 28 Aug 2022 14:51 )             45.9     08-28    132<L>  |  103  |  23  ----------------------------<  138<H>  >9.0<HH>   |  22  |  1.50<H>    Ca    9.6      28 Aug 2022 14:51    TPro  8.7<H>  /  Alb  4.0  /  TBili  0.3  /  DBili  x   /  AST  133<H>  /  ALT  82<H>  /  AlkPhos  103  08-28    CAPILLARY BLOOD GLUCOSE      POCT Blood Glucose.: 131 mg/dL (28 Aug 2022 14:08)    LIVER FUNCTIONS - ( 28 Aug 2022 14:51 )  Alb: 4.0 g/dL / Pro: 8.7 g/dL / ALK PHOS: 103 U/L / ALT: 82 U/L / AST: 133 U/L / GGT: x               CSF:        Radiology:  CT Head No Cont:  (28 Aug 2022 16:58)    INTERPRETATION: INDICATION: Altered mental status. History of recent hemorrhage.  TECHNIQUE: A non contrast thin section axial CT study of the brain was performed from skull base to vertex. Coronal and sagittal reformations were generated from the axial data.  COMPARISON EXAMINATION: Prior CT 8/1/2022    FINDINGS:    HEMISPHERES: There has been significant resorption of the hematoma in the left posterior frontal parasagittal region, with residual low level edema and isodensity of the residual hemorrhage. There is no evidence of rebleeding. Again noted is extensive gliosis and encephalomalacia in the right hemisphere from an old infarct that. No acute abnormality is suggested.  VENTRICLES: Midline with moderate ex vacuo enlargement right greater than left  POSTERIOR FOSSA: No acute abnormality noted. Mild chronic ischemic changes can be seen.  EXTRACEREBRAL SPACES: No subdural or epidural collections are noted.  SKULL BASE AND CALVARIUM: No acute fracture identified.  SINUSES AND MASTOIDS: Clear.  MISCELLANEOUS: No orbital or suprasellar abnormality noted.    IMPRESSION:    1) interval near complete resorption of the left the posterior frontal parasagittal hematoma noted previously without suggestion rebleeding.  2) chronic ischemic changes again noted in both hemispheres inclusive of a large old right frontal infarct anteriorly and medially. Follow-up MR imaging of the brain recommended for further assessment.        ACC: [id number] EXAM: CT ANGIO BRAIN (W)AW IC    PROCEDURE DATE: 08/28/2022        INTERPRETATION: CLINICAL INDICATION: Acute subarachnoid hemorrhage    TECHNIQUE: CT angiogram study of the brain was performed with thin axial slices. Sagittal and coronal maximum intensity projection reformats were provided. 70 cc intravenous Omnipaque 350 contrast was administered, 0 cc contrast was discarded.    COMPARISON: 7/30/2022    FINDINGS:  The distal internal carotid arteries are patent.  The [place] of [place] is normal in appearance.  The visualized cerebral arteries are unremarkable.  Left vertebral artery is hypoplastic and terminates as the left PICA. The vertebrobasilar junction and basilar artery are normal.    IMPRESSION:  No stenosis or aneurysm.   Neurology - Consult Note - 08-28-22     Name: JOSIE HUGHES; 82y (1940)    Reason for Admission:     Chief Complaint: 81 yo F with AMS    HPI: History obtained from family over phone as patient unable to provide history despite use of  services (#[id number]). 81 yo F, HTN, HLD, prior stroke R side bleed 10 years ago with residual L deficits, L shoulder pain from 10 years ago with contracted arm per family, Left frontal bleed 4 weeks ago, COVID (finished quarantine 8 days ago),  presents to the ED for altered mentation since Friday night. Patient's family visited her at rehab yesterday around 4pm. At that time, she had no problems communicating. Her day caregiver went home. Her night caregiver noted patient slept through the night without waking up, which was unusual for her. In the morning, patient was difficult to wake and deep asleep. Per family, patient did have a substantial drop in her BP this morning, stating it had dropped to the ?60s. She was sent her from her rehab facility to get a CT scan. Family reported that patient had received an overdose of oxycodone at the facility earlier last week, and had similar presentation at that time. They also noted that her gabapentin dose was upped from 50 to 100 on Friday, and that Saturday morning, she started feeling some side effects. Family states that patient is very sensitive to any changes in her medications. Family insisting that if lab workup and imaging results do not show anything cardiac or neurological that she be sent back to the facility to recuperate there as they worry about the change in environment worsening her delirium.      Review of Systems: cannot obtain given patient's mentation. Patient had not been complaining of anything per family. No fevers per family.    Allergies: NKDA    PMHx/PSHx:   Hypertension  Hypercholesterolemia  Prior stroke- R side bleed 10 years ago w L arm paralysis  L frontal bleed 4 weeks ago    PFHx: Non-contributory    PSuHx: No reports of current tobacco, alcohol, or illicit drug use      Medications:  MEDICATIONS  (STANDING):  cefTRIAXone   IVPB 1000 milliGRAM(s) IV Intermittent once    MEDICATIONS  (PRN):      Vitals:  T(C): --  HR: 82 (08-28-22 @ 13:44) (82 - 82)  BP: 126/75 (08-28-22 @ 13:44) (126/75 - 126/75)  RR: 16 (08-28-22 @ 13:44) (16 - 16)  SpO2: 96% (08-28-22 @ 13:44) (96% - 96%)    Physical Examination:  Constitutional: Female, appears stated age, is yelling when approached  Head: Normocephalic; Eyes: clear sclera;   CV/Extremities: No cyanosis; Skin: No karlee edema of LE. Peripheral pulses palpable  Resp: breathing comfortably   Eyes: Fundoscopy not well visualized    Neurological (>12):  MS: Awake, alert.  Does not follow commands. Yells out when she is touched or asked a question  Language: Cannot assess as patient refuses to answer any questions or follow any commands  CNs: PERRL. VFF to threat. EOMI grossly, no obvious gaze preference.  Tongue midline.     Motor: Left extremity flexed, contracted at baseline. Patient resists passive elevation or movement of R arm or leg, screams out when touched.     Sensation: Intact to LT R upper and lower extremity. Not withdrawing on the left side. Continuously yelling so difficult to assess if pain or no pain.    Reflexes R/L: RLE 3+, clonus present. LLE 1+. RUE 2+, LUE 3+  Coordination: cannot assess  Gait: cannot assess        Labs:                        14.2   8.51  )-----------( 274      ( 28 Aug 2022 14:51 )             45.9     08-28    132<L>  |  103  |  23  ----------------------------<  138<H>  >9.0<HH>   |  22  |  1.50<H>    Ca    9.6      28 Aug 2022 14:51    TPro  8.7<H>  /  Alb  4.0  /  TBili  0.3  /  DBili  x   /  AST  133<H>  /  ALT  82<H>  /  AlkPhos  103  08-28    CAPILLARY BLOOD GLUCOSE      POCT Blood Glucose.: 131 mg/dL (28 Aug 2022 14:08)    LIVER FUNCTIONS - ( 28 Aug 2022 14:51 )  Alb: 4.0 g/dL / Pro: 8.7 g/dL / ALK PHOS: 103 U/L / ALT: 82 U/L / AST: 133 U/L / GGT: x               CSF:        Radiology:  CT Head No Cont:  (28 Aug 2022 16:58)    INTERPRETATION: INDICATION: Altered mental status. History of recent hemorrhage.  TECHNIQUE: A non contrast thin section axial CT study of the brain was performed from skull base to vertex. Coronal and sagittal reformations were generated from the axial data.  COMPARISON EXAMINATION: Prior CT 8/1/2022    FINDINGS:    HEMISPHERES: There has been significant resorption of the hematoma in the left posterior frontal parasagittal region, with residual low level edema and isodensity of the residual hemorrhage. There is no evidence of rebleeding. Again noted is extensive gliosis and encephalomalacia in the right hemisphere from an old infarct that. No acute abnormality is suggested.  VENTRICLES: Midline with moderate ex vacuo enlargement right greater than left  POSTERIOR FOSSA: No acute abnormality noted. Mild chronic ischemic changes can be seen.  EXTRACEREBRAL SPACES: No subdural or epidural collections are noted.  SKULL BASE AND CALVARIUM: No acute fracture identified.  SINUSES AND MASTOIDS: Clear.  MISCELLANEOUS: No orbital or suprasellar abnormality noted.    IMPRESSION:    1) interval near complete resorption of the left the posterior frontal parasagittal hematoma noted previously without suggestion rebleeding.  2) chronic ischemic changes again noted in both hemispheres inclusive of a large old right frontal infarct anteriorly and medially. Follow-up MR imaging of the brain recommended for further assessment.        ACC: [id number] EXAM: CT ANGIO BRAIN (W)AW IC    PROCEDURE DATE: 08/28/2022        INTERPRETATION: CLINICAL INDICATION: Acute subarachnoid hemorrhage    TECHNIQUE: CT angiogram study of the brain was performed with thin axial slices. Sagittal and coronal maximum intensity projection reformats were provided. 70 cc intravenous Omnipaque 350 contrast was administered, 0 cc contrast was discarded.    COMPARISON: 7/30/2022    FINDINGS:  The distal internal carotid arteries are patent.  The [place] of [place] is normal in appearance.  The visualized cerebral arteries are unremarkable.  Left vertebral artery is hypoplastic and terminates as the left PICA. The vertebrobasilar junction and basilar artery are normal.    IMPRESSION:  No stenosis or aneurysm.

## 2022-08-28 NOTE — ED ADULT NURSE NOTE - NSIMPLEMENTINTERV_GEN_ALL_ED
Implemented All Fall with Harm Risk Interventions:  [place] to call system. Call bell, personal items and telephone within reach. Instruct patient to call for assistance. Room bathroom lighting operational. Non-slip footwear when patient is off stretcher. Physically safe environment: no spills, clutter or unnecessary equipment. Stretcher in lowest position, wheels locked, appropriate side rails in place. Provide visual cue, wrist band, yellow gown, etc. Monitor gait and stability. Monitor for mental status changes and reorient to person, place, and time. Review medications for side effects contributing to fall risk. Reinforce activity limits and safety measures with patient and family. Provide visual clues: red socks.

## 2022-08-28 NOTE — CONSULT NOTE ADULT - ATTENDING COMMENTS
HPI as per resident note, personally verified by me. Patient with acute onset of confusion as well as hypotension since 8/28. Daughter reports patient continues to be confused but no new focal neurologic deficits and no abnormal movements. She had similar recent presentation in setting of being given oxycodone and just had gabapentin increased from 100mg to 200mg (BID or TID?). Daughter would like her to be discharged back to [facility].    MS: AAO x 1 (self only), speech limited and tangential but no obvious dysarthria, occasionally follows simple commands. Rep/naming intact. Attn/conc, recent and remote memory, fund of knowledge dec  CNs: PERRL. VFF to threat. EOMI grossly, no obvious gaze preference. Face sens/str intact b/l. Hearing intact to conversation b/l. Tongue/palate midline. Trap 5/5 b/l    Motor: Left extremity flexed, contracted at baseline. Remainder of john normal. Bulk mildly dec throughout. Strength RUE 4+/5, LUE 4/5, RLE 3+/5, LLE 1/5     Sensation: Intact to LT/temp all (slightly inc on R > L)  Reflexes R/L: RLE 4+, clonus present. LLE 1+. RUE 2+, LUE 3+. Neutral L and downgoing R plantar response  Coordination: Grossly appropriate due to level of strength  Gait and station: Due to fall risk/safety concerns did not assess    A/P:  Encephalopathy  UTI  SURJIT  Prior stroke  Recent intraparenchymal hemorrhage (IPH)  HTN    - Etiology for encephalopathy likely multifactorial with recent acute medical issue (SURJIT and UTI) as well as iatrogenic (increase in gabapentin dose). Also need to consider non-convulsive seizures, especially with cortical based previous stroke, as well as additional toxic/metabolic event. No new focal neurologic deficits to suggest cerebrovascular event and IPH is stable on CT head  - Labs as per resident note  - Consider rEEG to evaluate for focal slowing, epileptiform discharges, or seizures; can be done as outpatient if patient is rapidly improving  - No need for additional head imaging at this time  - Continue to address above medical problems, as you are doing  - Will continue to follow patient with you

## 2022-08-28 NOTE — ED ADULT NURSE NOTE - OBJECTIVE STATEMENT
@1315, pt was bib EMS from rehab for eval of altered mental status in which she was found less responsive than is usual for her.  Per her aide who works with her daily, she was possibly a little less alert at the end of her shift last nigh @2200, but things were definitely worse today as she is non-verbal now.  Upon arrival, pt is awake, remains non-verbal, but moans and tries to pull away when IV placement attempted.  New onset intermittent tremor noted right arm.  Pt unable to follow commands at this time.  per family, a similar event occurred when some of her meds were changed in the past.

## 2022-08-28 NOTE — ED PROVIDER NOTE - OBJECTIVE STATEMENT
Patient is a 82y female who presents to the ED with altered mental status from [facility] physical therapy. Patient was in rehab for hx of stroke. According to the patients family they noticed she was altered today around 7am. Daughter saw her at 4pm and was talking to her yesterday. The aide was with her until 10pm where she was arousing at the time. The patient had a recent change in medication from 50 to 100 in gabapentin according to the daughter. Last time something similar happened it was due to oxycodone medication treatment. Denies any fever, chills, cough, shortness of breath, urinary symptoms.

## 2022-08-28 NOTE — ED PROVIDER NOTE - ATTENDING CONTRIBUTION TO CARE
MD Sunshine:  patient seen and evaluated with the resident.  I was present for key portions of the History & Physical, and I agree with the Impression & Plan.  MD Sunshine:  83 yo F[facility] status off baseline.   Duration/onset: 7am today  Context:  2012 - Right frontal intraparenchymal hemorrhage in 2012 w/ residual L hemiplegia  7/19/22 - L frontal IPH first diagnosed via outpatient MRI brain w/ associated R hemiparesis.  a  7/21/22 - Repeat stable CTH.   8/2/22 - worsening R hemiparesis and slowed responsiveness first noted by her daughter the night prior  8/28/22 - today - worsening mental status/less responsive.    VS: wnl.  Afebrile rectally.    Gen:  chronically debilitated adult F, nonverbal at this time.    NCAT: PERRL  Neck: supple.  CV: RRR.  Pulm:  CTA B.  Abd: s/nd.  Ext:  LUE and LLE contracted.  RUE +spontaneous movement, but not following commands  Neuro: Awake, but not following commands.    Impression:  DDx includes recurrent CVA/bleed, vs metabolic encephalopathy,  with last known well > 16 hrs ago, in a patient who is not a TPA candidate, poor embolectomy candidate.  No code stroke at this time.   Plan:  repeat CT head r/o recurrent ICH, metabolic workup, including UA/UCx, ECG.  Neuro consult.

## 2022-08-28 NOTE — ED PROVIDER NOTE - PHYSICAL EXAMINATION
Physical exam limited due to patient status    Physical Exam:  Gen: NAD, AOX0, non-toxic appearing, unable to ambulate  Head: NCAT  HEENT: EOMI, PEERLA, normal conjunctiva  Lung: CTAB, no respiratory distress, no wheezes/rhonchi/rales B/L  CV: RRR  Abd: soft, NT, ND, no guarding, no rigidity, no rebound tenderness  Neuro: Unable to assess   Psych: Unable to assess

## 2022-08-28 NOTE — CONSULT NOTE ADULT - ASSESSMENT
Assessment: History obtained from family over phone as patient unable to provide history despite use of  services (#[id number]). 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 Patient's family visited her at rehab yesterday around 4pm. At that time, she had no problems communicating. Her day caregiver went home. Her night caregiver noted patient slept through the night without waking up, which was unusual for her. In the morning, patient was difficult to wake and deep asleep. Per family, patient did have a substantial drop in her BP this morning, stating it had dropped to the ?60s. She was sent her from her rehab facility to get a CT scan. Family reported that patient had received an overdose of oxycodone at the facility earlier last week, and had similar presentation at that time. They also noted that her gabapentin dose was upped from 50 to 100 on Friday, and that Saturday morning, she started feeling some side effects. Family states that patient is very sensitive to any changes in her medications. Family insisting that if lab workup and imaging results do not show anything cardiac or neurological that she be sent back to the facility to recuperate there as they worry about the change in environment worsening her delirium.    Impression: Altered mentation in a patient with possible UTI, history of prior stroke, resolving SAH. Difficult to assess patient as patient is refusing to follow any commands or speak or answer any questions, unclear baseline. Altered status could be secondary to UTI vs medication changes.    [] MRI brain with and without contrast to follow up on prior admission and to evaluate for any acute changes  [] Treatment for UTI per primary team  [] Delirium precautions    *Recommendations not final until attested by attending.* Assessment: History obtained from family over phone as patient unable to provide history despite use of  services (#[id number]). 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 Patient's family visited her at rehab yesterday around 4pm. At that time, she had no problems communicating. Her day caregiver went home. Her night caregiver noted patient slept through the night without waking up, which was unusual for her. In the morning, patient was difficult to wake and deep asleep. Per family, patient did have a substantial drop in her BP this morning, stating it had dropped to the ?60s. She was sent her from her rehab facility to get a CT scan. Family reported that patient had received an overdose of oxycodone at the facility earlier last week, and had similar presentation at that time. They also noted that her gabapentin dose was upped from 50 to 100 on Friday, and that Saturday morning, she started feeling some side effects. Family states that patient is very sensitive to any changes in her medications. Family insisting that if lab workup and imaging results do not show anything cardiac or neurological that she be sent back to the facility to recuperate there as they worry about the change in environment worsening her delirium.    Impression: Altered mentation in a patient with possible UTI, history of prior stroke, resolving SAH. Difficult to assess patient as patient is refusing to follow any commands or speak or answer any questions, unclear baseline. Altered status could be secondary to delirium in the setting of UTI vs less likely medication changes.     [] Workup for toxic metabolic and infectious causes of encephalopathy per primary team  [] CBC, CMP, Mg, Ph, TSH, T3, Free T4, Vit B1, B6, B9, B12, Vit D, Iron, Ammonia, CK, ESR, SRP, Lactic acid, Calcium  [] MRI brain with and without contrast to follow up on prior admission and to evaluate for any acute changes  [] Consider cardiac workup for fall in BP reported in histoy  [] Check orthostatic vitals  [] Treatment for UTI per primary team  [] Delirium precautions  [] PT/OT  [] Can discuss medication regimen in the AM    Patient to be seen on AM rounds by neurology attending  *Recommendations not final until attested by attending.* Assessment: History obtained from family over phone as patient unable to provide history despite use of  services (#[id number]). 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 Patient's family visited her at rehab yesterday around 4pm. At that time, she had no problems communicating. Her day caregiver went home. Her night caregiver noted patient slept through the night without waking up, which was unusual for her. In the morning, patient was difficult to wake and deep asleep. Per family, patient did have a substantial drop in her BP this morning, stating it had dropped to the ?60s. She was sent her from her rehab facility to get a CT scan. Family reported that patient had received an overdose of oxycodone at the facility earlier last week, and had similar presentation at that time. They also noted that her gabapentin dose was upped from 50 to 100 on Friday, and that Saturday morning, she started feeling some side effects. Family states that patient is very sensitive to any changes in her medications. Family insisting that if lab workup and imaging results do not show anything cardiac or neurological that she be sent back to the facility to recuperate there as they worry about the change in environment worsening her delirium.    Impression: Altered mentation in a patient with possible UTI, history of prior stroke, resolving SAH. Difficult to assess patient as patient is refusing to follow any commands or speak or answer any questions, unclear baseline. Altered status could be secondary to delirium in the setting of UTI vs less likely medication changes.     [] Workup for toxic metabolic and infectious causes of encephalopathy per primary team  [] CBC, CMP, Mg, Ph, TSH, T3, Free T4, Vit B1, B6, B9, B12, Vit D, Ammonia, CK, ESR, CRP, Lactic acid, Calcium  [] MRI brain with and without contrast to follow up on prior admission and to evaluate for any acute changes  [] Consider cardiac workup for fall in BP reported in histoy  [] Check orthostatic vitals  [] Treatment for UTI per primary team  [] Delirium precautions  [] PT/OT  [] Can discuss medication regimen in the AM    Patient to be seen on AM rounds by neurology attending  *Recommendations not final until attested by attending.*

## 2022-08-28 NOTE — H&P ADULT - NSHPPHYSICALEXAM_GEN_ALL_CORE
T(C): --  HR: 82 (08-28-22 @ 13:44) (82 - 82)  BP: 126/75 (08-28-22 @ 13:44) (126/75 - 126/75)  RR: 16 (08-28-22 @ 13:44) (16 - 16)  SpO2: 96% (08-28-22 @ 13:44) (96% - 96%)    PHYSICAL EXAM:  GENERAL: NAD, well-developed  HEAD:  Atraumatic, Normocephalic  EYES: EOMI, PERRLA, conjunctiva and sclera clear  NECK: Supple, No JVD  CHEST/LUNG: Clear to auscultation bilaterally; No wheeze  HEART: Regular rate and rhythm; No murmurs, rubs, or gallops  ABDOMEN: Soft, Nontender, Nondistended; Bowel sounds present  EXTREMITIES:  2+ Peripheral Pulses, No clubbing, cyanosis, or edema  PSYCH: AAOx3  NEUROLOGY: non-focal  SKIN: No rashes or lesions

## 2022-08-28 NOTE — H&P ADULT - HISTORY OF PRESENT ILLNESS
82y female who presents to the ED with altered mental status from [facility] physical therapy. Patient was in rehab for hx of stroke. According to the patients family they noticed she was altered today around 7am. Daughter saw her at 4pm and was talking to her yesterday. The aide was with her until 10pm where she was arousing at the time. The patient had a recent change in medication from 50 to 100 in gabapentin according to the daughter. Last time something similar happened it was due to oxycodone medication treatment. Denies any fever, chills, cough, shortness of breath, urinary symptoms.

## 2022-08-28 NOTE — H&P ADULT - NSICDXPASTMEDICALHX_GEN_ALL_CORE_FT
PAST MEDICAL HISTORY:  Hypercholesterolemia     Hypertension      PAST MEDICAL HISTORY:  Hypercholesterolemia     Hypertension     Stroke x 2

## 2022-08-28 NOTE — ED ADULT NURSE REASSESSMENT NOTE - NS ED NURSE REASSESS COMMENT FT1
Pt more alert, but n ot quite back to her usual mental status.  she is conversant with family. who state her speech is clear and she is making appropriate comments for their conversation.

## 2022-08-28 NOTE — H&P ADULT - ASSESSMENT
82y female who presents to the ED with altered mental status from [facility] physical therapy. Patient was in rehab for hx of stroke. According to the patients family they noticed she was altered today around 7am. Daughter saw her at 4pm and was talking to her yesterday. The aide was with her until 10pm where she was arousing at the time. The patient had a recent change in medication from 50 to 100 in gabapentin according to the daughter. Last time something similar happened it was due to oxycodone medication treatment. Denies any fever, chills, cough, shortness of breath, urinary symptoms.    A/P  daughter at bedside, ptn's MS is back at baseline, she has UTI  would abstain from Depakote or any narcotics. 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 plan to dc back to [facility] once cleared by neuro  dvt ppx w po Xarelto  cont outptn meds

## 2022-08-29 ENCOUNTER — TRANSCRIPTION ENCOUNTER (OUTPATIENT)
Age: 82
End: 2022-08-29

## 2022-08-29 LAB
CK SERPL-CCNC: 70 U/L — SIGNIFICANT CHANGE UP (ref 25–170)
CRP SERPL-MCNC: <3 MG/L — SIGNIFICANT CHANGE UP (ref 0–4)
ERYTHROCYTE [SEDIMENTATION RATE] IN BLOOD: 59 MM/HR — HIGH (ref 0–20)
IRON SATN MFR SERPL: 50 UG/DL — SIGNIFICANT CHANGE UP (ref 30–160)
LACTATE SERPL-SCNC: 2.1 MMOL/L — HIGH (ref 0.5–2)
LACTATE SERPL-SCNC: 3.9 MMOL/L — HIGH (ref 0.5–2)
T3 SERPL-MCNC: 88 NG/DL — SIGNIFICANT CHANGE UP (ref 80–200)
T4 AB SER-ACNC: 8.8 UG/DL — SIGNIFICANT CHANGE UP (ref 4.6–12)
TSH SERPL-MCNC: 0.71 UIU/ML — SIGNIFICANT CHANGE UP (ref 0.27–4.2)
VIT B12 SERPL-MCNC: 1154 PG/ML — SIGNIFICANT CHANGE UP (ref 232–1245)
VIT D25+D1,25 OH+D1,25 PNL SERPL-MCNC: 34.2 PG/ML — SIGNIFICANT CHANGE UP (ref 19.9–79.3)

## 2022-08-29 PROCEDURE — 99223 1ST HOSP IP/OBS HIGH 75: CPT | Mod: GC

## 2022-08-29 RX ORDER — MEMANTINE HYDROCHLORIDE 10 MG/1
5 TABLET ORAL EVERY 12 HOURS
Refills: 0 | Status: DISCONTINUED | OUTPATIENT
Start: 2022-08-29 | End: 2022-08-30

## 2022-08-29 RX ORDER — CEFUROXIME AXETIL 250 MG
1 TABLET ORAL
Qty: 6 | Refills: 0
Start: 2022-08-29 | End: 2022-08-31

## 2022-08-29 RX ORDER — CEFEPIME 1 G/1
1000 INJECTION, POWDER, FOR SOLUTION INTRAMUSCULAR; INTRAVENOUS EVERY 12 HOURS
Refills: 0 | Status: DISCONTINUED | OUTPATIENT
Start: 2022-08-29 | End: 2022-08-30

## 2022-08-29 RX ORDER — THIAMINE MONONITRATE (VIT B1) 100 MG
100 TABLET ORAL DAILY
Refills: 0 | Status: COMPLETED | OUTPATIENT
Start: 2022-08-29 | End: 2022-08-30

## 2022-08-29 RX ADMIN — SIMVASTATIN 20 MILLIGRAM(S): 20 TABLET, FILM COATED ORAL at 20:59

## 2022-08-29 RX ADMIN — LEVETIRACETAM 500 MILLIGRAM(S): 250 TABLET, FILM COATED ORAL at 05:25

## 2022-08-29 RX ADMIN — LEVETIRACETAM 500 MILLIGRAM(S): 250 TABLET, FILM COATED ORAL at 20:55

## 2022-08-29 RX ADMIN — Medication 75 MICROGRAM(S): at 05:25

## 2022-08-29 RX ADMIN — Medication 50 MILLIGRAM(S): at 05:25

## 2022-08-29 RX ADMIN — SODIUM CHLORIDE 75 MILLILITER(S): 9 INJECTION, SOLUTION INTRAVENOUS at 20:53

## 2022-08-29 RX ADMIN — SODIUM CHLORIDE 75 MILLILITER(S): 9 INJECTION, SOLUTION INTRAVENOUS at 01:02

## 2022-08-29 RX ADMIN — Medication 5 MILLIGRAM(S): at 02:53

## 2022-08-29 RX ADMIN — CEFTRIAXONE 100 MILLIGRAM(S): 500 INJECTION, POWDER, FOR SOLUTION INTRAMUSCULAR; INTRAVENOUS at 20:53

## 2022-08-29 RX ADMIN — DIVALPROEX SODIUM 125 MILLIGRAM(S): 500 TABLET, DELAYED RELEASE ORAL at 01:10

## 2022-08-29 RX ADMIN — Medication 100 MILLIGRAM(S): at 11:44

## 2022-08-29 RX ADMIN — LOSARTAN POTASSIUM 50 MILLIGRAM(S): 100 TABLET, FILM COATED ORAL at 05:25

## 2022-08-29 NOTE — OCCUPATIONAL THERAPY INITIAL EVALUATION ADULT - NS ASR FOLLOW COMMAND OT EVAL
in Slovenian, however pt agitated and not participating/50% of the time/able to follow single-step instructions

## 2022-08-29 NOTE — OCCUPATIONAL THERAPY INITIAL EVALUATION ADULT - RANGE OF MOTION EXAMINATION, UPPER EXTREMITY
LUE contracted from previous CVA, resisting assessment/Right UE Active ROM was WFL (within functional limits)

## 2022-08-29 NOTE — OCCUPATIONAL THERAPY INITIAL EVALUATION ADULT - PERTINENT HX OF CURRENT PROBLEM, REHAB EVAL
82y female who presents to the ED with altered mental status from [facility] physical therapy. Patient was in rehab for hx of stroke. According to the patients family they noticed she was altered today around 7am. Daughter saw her at 4pm and was talking to her yesterday. The aide was with her until 10pm where she was arousing at the time. The patient had a recent change in medication from 50 to 100 in gabapentin according to the daughter. Last time something similar happened it was due to oxycodone medication treatment. Denies any fever, chills, cough, shortness of breath, urinary symptoms.

## 2022-08-29 NOTE — DISCHARGE NOTE PROVIDER - NSDCFUADDAPPT_GEN_ALL_CORE_FT
Patient can follow up with general neurology at [address] [place] [address] after discharge. Please instruct the patient to call [phone] to schedule this appointment

## 2022-08-29 NOTE — CONSULT NOTE ADULT - SUBJECTIVE AND OBJECTIVE BOX
[facility], Division of Infectious Diseases  FERN Jenkins S. Shah, Y. Patel, G. Casimir  [phone]  ([phone] - weekdays after 5pm and weekends)    JOSIE HUGHES  82y, Female  [id number]    HPI:  Patient is a 82 year old female with PMH of HTN, HLD, CVA who presented to the ED with altered mental status from [facility] physical therapy. Patient was in rehab for hx of stroke. According to the patients family they noticed she was altered today around 7am. Daughter saw her at 4pm and was talking to her yesterday. The aide was with her until 10pm where she was arousing at the time. The patient had a recent change in medication from 50 to 100 in gabapentin according to the daughter. Last time something similar happened it was due to oxycodone medication treatment. Denies any fever, chills, cough, shortness of breath, urinary symptoms. (28 Aug 2022 20:11)  ROS: 14 point review of systems completed, pertinent positives and negatives as per HPI.    Allergies: No Known Allergies    PMH -- Hypertension  Hypercholesterolemia  Stroke    PSH -- no known surgical history  FH -- noncontributory   Social History -- denies tobacco, alcohol or illicit drug use    Physical Exam--  Vital Signs Last 24 Hrs  T(F): 98.3 (29 Aug 2022 12:29), Max: 100 (29 Aug 2022 01:30)  HR: 101 (29 Aug 2022 12:29) (74 - 127)  BP: 144/76 (29 Aug 2022 12:29) (125/66 - 170/84)  RR: 18 (29 Aug 2022 12:29) (15 - 18)  SpO2: 96% (29 Aug 2022 12:29) (93% - 99%)  General: no acute distress  HEENT: NC/AT, EOMI, anicteric, neck supple  Lungs: Clear bilaterally without rales, wheezing or rhonchi  Heart: S1, S2 present, regular rate and rhythm. No murmur   Abdomen: Soft. Nondistended. Nontender. BS present  Neuro: AAOx3, no obvious focal deficits   Extremities: No cyanosis or clubbing. No edema.   Skin: Warm. Dry. Good turgor. No visible rash.  Psychiatric: Appropriate affect and mood for situation.   Lines: PIV    Laboratory & Imaging Data--  CBC:                       14.2   8.51  )-----------( 274      ( 28 Aug 2022 14:51 )             45.9     WBC Count: 8.51 K/uL (22 @ 14:51)    CMP:     140  |  105  |  23  ----------------------------<  143<H>  4.5   |  22  |  1.44<H>    Ca    9.7      28 Aug 2022 18:34    TPro  7.5  /  Alb  4.1  /  TBili  0.2  /  DBili  x   /  AST  32  /  ALT  42  /  AlkPhos  115      LIVER FUNCTIONS - ( 28 Aug 2022 18:34 )  Alb: 4.1 g/dL / Pro: 7.5 g/dL / ALK PHOS: 115 U/L / ALT: 42 U/L / AST: 32 U/L / GGT: x           Urinalysis Basic - ( 28 Aug 2022 14:51 )  Color: Light Orange / Appearance: Turbid / S.016 / pH: x  Gluc: x / Ketone: Negative  / Bili: Negative / Urobili: Negative   Blood: x / Protein: 100 / Nitrite: Positive   Leuk Esterase: Large / RBC: 10 /hpf / WBC >50   Sq Epi: x / Non Sq Epi: 3 / Bacteria: Many    Microbiology: reviewed   - Flu/RSV/COVID - negative     Radiology--reviewed  CT Angio Head w/ IV Cont (22 @ 16:58) >IMPRESSION: No stenosis or aneurysm.    CT Head No Cont (22 @ 16:58) >IMPRESSION:  1)  interval near complete resorption of the left the posterior frontal parasagittal hematoma noted previously without suggestion rebleeding. 2)  chronic ischemic changes again noted in both hemispheres inclusive of  a largeold right frontal infarct anteriorly and medially. Follow-up MR imaging of the brain recommended for further assessment.    Xray Chest 1 View- PORTABLE-Urgent (Xray Chest 1 View- PORTABLE-Urgent .) (22 @ 15:13) > IMPRESSION: Clear lungs.    Active Medications--  cefTRIAXone   IVPB 1000 milliGRAM(s) IV Intermittent every 24 hours  diVALproex Sprinkle 125 milliGRAM(s) Oral every 6 hours PRN  levETIRAcetam 500 milliGRAM(s) Oral two times a day  levothyroxine 75 MICROGram(s) Oral daily  losartan 50 milliGRAM(s) Oral daily  melatonin 5 milliGRAM(s) Oral at bedtime PRN  memantine 5 milliGRAM(s) Oral every 12 hours  metoprolol succinate ER 50 milliGRAM(s) Oral daily  rivaroxaban 10 milliGRAM(s) Oral daily  senna 2 Tablet(s) Oral at bedtime PRN  simvastatin 20 milliGRAM(s) Oral at bedtime  sodium chloride 0.45%. 1000 milliLiter(s) IV Continuous <Continuous>  thiamine Injectable 100 milliGRAM(s) IV Push daily    Antimicrobials: cefTRIAXone   IVPB 1000 milliGRAM(s) IV Intermittent every 24 hours   [facility], Division of Infectious Diseases  FERN Jenkins S. Shah, Y. Patel, G. [facility]  [phone]  ([phone] - weekdays after 5pm and weekends)    JOSIE HUGHES  82y, Female  [id number]    HPI:  Patient is a 82 year old female with PMH of HTN, HLD, CVA (first 10 years ago and 2nd a few weeks ago) who presented to the ED with altered mental status from [facility] physical [facility]. Patient was in rehab for hx of stroke. According to the patients family they noticed she was altered today around 7am. Daughter saw her at 4pm and was talking to her yesterday. The aide was with her until 10pm where she was arousing at the time. The patient had a recent change in medication from 50 to 100 in gabapentin according to the daughter. Last time something similar happened it was due to oxycodone medication treatment. Denies any fever, chills, cough, shortness of breath, urinary symptoms. (28 Aug 2022 20:11)  Patient seen this afternoon at bedside in the ER, aid at bedside. Spoke to patients daughter, Maru, over the phone for history. She confirms above history, states she really believes it is due to the change in her gabapentin dosage. States patient is back to her baseline, also reports that when patient screams, it is due to frustration and not pain. She denies any fever or chills noted; patient has not had any changes in urinary habits, no abdominal pain, nausea, vomiting or diarrhea. Denies chest pain, cough or dyspnea. Maru would like patient to return to [facility] tomorrow if possible.   ROS: 14 point review of systems completed, pertinent positives and negatives as per HPI.    Allergies: No Known Allergies    PMH -- Hypertension  Hypercholesterolemia  Stroke    PSH -- no known surgical history  FH -- noncontributory   Social History -- denies tobacco, alcohol or illicit drug use    Physical Exam--  Vital Signs Last 24 Hrs  T(F): 98.3 (29 Aug 2022 12:29), Max: 100 (29 Aug 2022 01:30)  HR: 101 (29 Aug 2022 12:29) (74 - 127)  BP: 144/76 (29 Aug 2022 12:29) (125/66 - 170/84)  RR: 18 (29 Aug 2022 12:29) (15 - 18)  SpO2: 96% (29 Aug 2022 12:29) (93% - 99%)  General: no acute distress  HEENT: NC/AT, EOMI, anicteric, neck supple  Lungs: Clear bilaterally without rales, wheezing or rhonchi  Heart: S1, S2 present, regular rate and rhythm. No murmur   Abdomen: Soft. Nondistended. Nontender. BS present  Neuro: AAOx3, no obvious focal deficits   Extremities: No cyanosis or clubbing. No edema.   Skin: Warm. Dry. Good turgor. No visible rash.  Psychiatric: Appropriate affect and mood for situation.   Lines: PIV    Laboratory & Imaging Data--  CBC:                       14.2   8.51  )-----------( 274      ( 28 Aug 2022 14:51 )             45.9     WBC Count: 8.51 K/uL (22 @ 14:51)    CMP:     140  |  105  |  23  ----------------------------<  143<H>  4.5   |  22  |  1.44<H>    Ca    9.7      28 Aug 2022 18:34    TPro  7.5  /  Alb  4.1  /  TBili  0.2  /  DBili  x   /  AST  32  /  ALT  42  /  AlkPhos  115      LIVER FUNCTIONS - ( 28 Aug 2022 18:34 )  Alb: 4.1 g/dL / Pro: 7.5 g/dL / ALK PHOS: 115 U/L / ALT: 42 U/L / AST: 32 U/L / GGT: x           Urinalysis Basic - ( 28 Aug 2022 14:51 )  Color: Light Orange / Appearance: Turbid / S.016 / pH: x  Gluc: x / Ketone: Negative  / Bili: Negative / Urobili: Negative   Blood: x / Protein: 100 / Nitrite: Positive   Leuk Esterase: Large / RBC: 10 /hpf / WBC >50   Sq Epi: x / Non Sq Epi: 3 / Bacteria: Many    Microbiology: reviewed   - Flu/RSV/COVID - negative     Radiology--reviewed  CT Angio Head w/ IV Cont (22 @ 16:58) >IMPRESSION: No stenosis or aneurysm.    CT Head No Cont (22 @ 16:58) >IMPRESSION:  1)  interval near complete resorption of the left the posterior frontal parasagittal hematoma noted previously without suggestion rebleeding. 2)  chronic ischemic changes again noted in both hemispheres inclusive of  a largeold right frontal infarct anteriorly and medially. Follow-up MR imaging of the brain recommended for further assessment.    Xray Chest 1 View- PORTABLE-Urgent (Xray Chest 1 View- PORTABLE-Urgent .) (22 @ 15:13) > IMPRESSION: Clear lungs.    Active Medications--  cefTRIAXone   IVPB 1000 milliGRAM(s) IV Intermittent every 24 hours  diVALproex Sprinkle 125 milliGRAM(s) Oral every 6 hours PRN  levETIRAcetam 500 milliGRAM(s) Oral two times a day  levothyroxine 75 MICROGram(s) Oral daily  losartan 50 milliGRAM(s) Oral daily  melatonin 5 milliGRAM(s) Oral at bedtime PRN  memantine 5 milliGRAM(s) Oral every 12 hours  metoprolol succinate ER 50 milliGRAM(s) Oral daily  rivaroxaban 10 milliGRAM(s) Oral daily  senna 2 Tablet(s) Oral at bedtime PRN  simvastatin 20 milliGRAM(s) Oral at bedtime  sodium chloride 0.45%. 1000 milliLiter(s) IV Continuous <Continuous>  thiamine Injectable 100 milliGRAM(s) IV Push daily    Antimicrobials: cefTRIAXone   IVPB 1000 milliGRAM(s) IV Intermittent every 24 hours

## 2022-08-29 NOTE — DISCHARGE NOTE PROVIDER - NSDCMRMEDTOKEN_GEN_ALL_CORE_FT
cefuroxime 250 mg oral tablet: 1 tab(s) orally every 12 hours   insulin lispro 100 units/mL injectable solution: 1 Unit(s) if Glucose 151 - 200  2 Unit(s) if Glucose 201 - 250  3 Unit(s) if Glucose 251 - 300  4 Unit(s) if Glucose 301 - 350  5 Unit(s) if Glucose 351 - 400  6 Unit(s) if Glucose Greater Than 400      levETIRAcetam 500 mg oral tablet: 1 tab(s) orally 2 times a day  levothyroxine 75 mcg (0.075 mg) oral tablet: 1 tab(s) orally once a day  losartan 50 mg oral tablet: 1 tab(s) orally once a day  metoprolol succinate 50 mg oral tablet, extended release: 1 tab(s) orally once a day  nystatin 100,000 units/mL oral suspension: 5 milliliter(s) orally every 6 hours  senna leaf extract oral tablet: 2 tab(s) orally once a day (at bedtime), As needed, Constipation  simvastatin 20 mg oral tablet: 1 tab(s) orally once a day (at bedtime)

## 2022-08-29 NOTE — PHYSICAL THERAPY INITIAL EVALUATION ADULT - PERTINENT HX OF CURRENT PROBLEM, REHAB EVAL
82y female who presents to the ED with altered mental status from [facility] physical therapy. Patient was in rehab for hx of stroke. According to the patients family they noticed she was altered today around 7am. Daughter saw her at 4pm and was talking to her yesterday. The aide was with her until 10pm where she was arousing at the time. The patient had a recent change in medication from 50 to 100 in gabapentin according to the daughter. Last time something similar happened it was due to oxycodone medication treatment. 8/28 Head CT: 1) interval near complete resorption of the left the posterior frontal parasagittal hematoma noted previously without suggestion rebleeding. 2) chronic ischemic changes again noted in both hemispheres inclusive of a large old right frontal infarct anteriorly and medially.

## 2022-08-29 NOTE — ED ADULT NURSE REASSESSMENT NOTE - NS ED NURSE REASSESS COMMENT FT1
Handoff report received from RN Elyssa. Pt resting in purple 18 with aid at bedside. Pt A&O x 0, VSS except tachycardic. Pt moving in bed shouting. Pt unable to be redirected by aid or Sinhala  (Fabrizio ID [id number]). MD made aware. Handoff report received from RN Elyssa. Pt resting in purple 18 with aid at bedside. Pt A&O x 0, VSS except tachycardic. Pt moving in bed shouting. Pt unable to be redirected by aid or Nepali  (Fabrizio ID [id number]). RORY Dc made aware.

## 2022-08-29 NOTE — CONSULT NOTE ADULT - ASSESSMENT
Patient is a 82 year old female with PMH of HTN, HLD, CVA who presented to the ED with altered mental status from [facility] physical therapy.     AMS suspected due to UTI  possibly recent medication changes    - mental status back to baseline now    - noted elevated lactate, pt afebrile, no leukocytosis    - UA with pyuria    - prior cultures reviewed, no positive cultures noted    - follow urine culture   - continue ceftriaxone 1g IV Q24h   - monitor temps, WBC, lactate    - Neurology following       D/w Dr. Ferdinand Joseph M.D.  [facility], Division of Infectious Diseases  [phone]  After 5pm on weekdays and all day on weekends - please call [phone] Patient is a 82 year old female with PMH of HTN, HLD, CVA, CKD who presented to the ED with altered mental status from [facility] physical therapy.     AMS suspected due to UTI  possibly recent medication changes    - mental status back to baseline now    - noted elevated lactate, pt afebrile, no leukocytosis    - UA with pyuria    - prior cultures reviewed, no positive cultures noted    - follow urine culture, in process    - continue ceftriaxone 1g IV Q24h (no renal adjustment needed)   - monitor temps, WBC, lactate    - Neurology following       D/w Dr. Ferdinand Joseph M.D.  [facility], Division of Infectious Diseases  [phone]  After 5pm on weekdays and all day on weekends - please call [phone] Patient is a 82 year old female with PMH of HTN, HLD, CVA, CKD who presented to the ED with altered mental status from [facility] physical therapy.     AMS   UTI vs recent medication changes    - mental status back to baseline now per daughter   - noted elevated lactate, pt afebrile, no leukocytosis    - UA with pyuria    - prior cultures reviewed, no positive cultures noted    - follow urine culture, in process    - continue ceftriaxone 1g IV Q24h (no renal adjustment needed)   - monitor temps, WBC, lactate    - Neurology following       D/w Dr. Ferdinand Joseph M.D.  [facility], Division of Infectious Diseases  [phone]  After 5pm on weekdays and all day on weekends - please call [phone] Patient is a 82 year old female with PMH of HTN, HLD, CVA, CKD who presented to the ED with altered mental status from [facility] physical therapy.     AMS   UTI vs recent medication changes    - mental status back to baseline now per daughter   - noted elevated lactate, pt afebrile, no leukocytosis    - UA with pyuria    - prior cultures reviewed, no positive cultures noted    - follow urine culture    Addendum: later noted culture grew Citrobacter freundii complex    -- typically resistant to pcns and 3rd generation cephalosporins, usually sensitive to cefepime     -- discontinue ceftriaxone and start on cefepime 1g IV Q12h pending sensitivities    - monitor temps, WBC, lactate    - Neurology following       D/w Dr. Ferdinand Joseph M.D.  [facility], Division of Infectious Diseases  [phone]  After 5pm on weekdays and all day on weekends - please call [phone]

## 2022-08-29 NOTE — DISCHARGE NOTE PROVIDER - CARE PROVIDER_API CALL
Michelle Landrum)  Internal Medicine  [address] [place] [address], [facility] [address]  [place] [address]  Phone: [phone]  Fax: [phone]  Follow Up Time:

## 2022-08-29 NOTE — OCCUPATIONAL THERAPY INITIAL EVALUATION ADULT - LIVES WITH, PROFILE
As per daughter, pt has been at [facility] rehab ~3 weeks, prior she lived in private home with 24/7 HHA, requires assistance for all ADLs, walk in shower with bench

## 2022-08-29 NOTE — DISCHARGE NOTE PROVIDER - HOSPITAL COURSE
82y female who presents to the ED with altered mental status from [facility] physical therapy. Patient was in rehab for hx of stroke. According to the patients family they noticed she was altered today around 7am. Daughter saw her at 4pm and was talking to her yesterday. The aide was with her until 10pm where she was arousing at the time. The patient had a recent change in medication from 50 to 100 in gabapentin according to the daughter. Last time something similar happened it was due to oxycodone medication treatment. Denies any fever, chills, cough, shortness of breath, urinary symptoms.    A/P  daughter at bedside, ptn's MS is back at baseline, she has UTI   rigidity and spasticity post CVA, would need PT/OT  CTX for UTI, Cx sent - Ceftin for TULIO   PT eval. plan to dc back to [facility] once cleared by neuro  dvt ppx w po Xarelto  cont outptn meds     82y female who presents to the ED with altered mental status from [facility] physical therapy. Patient was in rehab for hx of stroke. According to the patients family they noticed she was altered today around 7am. Daughter saw her at 4pm and was talking to her yesterday. The aide was with her until 10pm where she was arousing at the time. The patient had a recent change in medication from 50 to 100 in gabapentin according to the daughter. Last time something similar happened it was due to oxycodone medication treatment. Denies any fever, chills, cough, shortness of breath, urinary symptoms.    A/P  daughter at bedside, ptn's MS is back at baseline, she has UTI   rigidity and spasticity post CVA, would need PT/OT  CTX for UTI, Cx sent - Ceftin for TULIO   PT eval. plan to dc back to [facility] once cleared by neuro  dvt ppx w po Xarelto  - Consider rEEG to evaluate for focal slowing, epileptiform discharges, or seizures; can be done as outpatient if patient is rapidly improving  - No need for additional head imaging at this time  - Continue to address above medical problems, as you are doing  cont outptn meds      Patient is a 82 year old female with PMH of HTN, HLD, CVA, CKD who presented to the ED  with altered mental status from [facility] physical therapy. Patient was in rehab for hx of stroke. According to the patients family they noticed she was altered today around 7am. Daughter saw her at 4pm and was talking to her yesterday. The aide was with her until 10pm where she was arousing at the time. The patient had a recent change in medication from 50 to 100 in gabapentin according to the daughter. Last time something similar happened it was due to oxycodone medication treatment. Denies any fever, chills, cough, shortness of breath, urinary symptoms.    A/P  daughter at bedside, ptn's MS is back at baseline, she has UTI   rigidity and spasticity post CVA, would need PT/OT  CTX for UTI, Cx sent - - urine culture grew Citrobacter freundii complex    -- typically resistant to pcns and 3rd generation cephalosporins, usually sensitive to cefepime, FQs     - s/p ceftriaxone > changed to cefepime last night    - will discontinue cefepime and switch to oral ciprofloxacin 500mg PO Q12h (Qtc ok) to complete 3d course on 9/1      PT eval. plan to dc back to [facility] once cleared by neuro  dvt ppx w po Xarelto  Consulted Neurology  - No need for additional head imaging at this time    Routine EEG can be done outpatient. No additional neurological work up recommended at this time.     Patient can follow up with general neurology at [address] [place] [address] after discharge. Please instruct the patient to call [phone] to schedule this appointment.   with altered mental status from [facility] physical therapy.     Daughter would like patient to return to rehab    Patient is a 82 year old female with PMH of HTN, HLD, CVA, CKD who presented to the ED  with altered mental status from [facility] physical therapy. Patient was in rehab for hx of stroke. According to the patients family they noticed she was altered today around 7am. Daughter saw her at 4pm and was talking to her yesterday. The aide was with her until 10pm where she was arousing at the time. The patient had a recent change in medication from 50 to 100 in gabapentin according to the daughter. Last time something similar happened it was due to oxycodone medication treatment. Denies any fever, chills, cough, shortness of breath, urinary symptoms.    A/P  daughter at bedside, ptn's MS is back at baseline, she has UTI   rigidity and spasticity post CVA, would need PT/OT  CTX for UTI, Cx sent - - urine culture grew Citrobacter freundii complex    -- typically resistant to pcns and 3rd generation cephalosporins, usually sensitive to cefepime, FQs     - s/p ceftriaxone > changed to cefepime last night    - will discontinue cefepime and switch to oral ciprofloxacin 500mg PO Q12h (Qtc ok) to complete 3d course on 9/1      PT eval. plan to dc back to [facility] once cleared by neuro  dvt ppx w po Xarelto  Consulted Neurology  - No need for additional head imaging at this time    Routine EEG can be done outpatient. No additional neurological work up recommended at this time.     Patient can follow up with general neurology at [address] [place] [address] after discharge. Please instruct the patient to call [phone] to schedule this appointment.   with altered mental status from [facility] physical therapy.     Daughter would like patient to return to rehab.    Dr. Streeter wants to patient only to receive Depakote for AMS, as patient did well in the hospital and not to try any other meds for confusion or agitation.  Daughter refusing to have her mother take Namenda and Xarelto,   As per  ,patient should not be continue on the above med.    medically cleared for discharge to Rehab by  .

## 2022-08-29 NOTE — DISCHARGE NOTE PROVIDER - NSDCCPCAREPLAN_GEN_ALL_CORE_FT
PRINCIPAL DISCHARGE DIAGNOSIS  Diagnosis: Acute UTI  Assessment and Plan of Treatment: HOME CARE INSTRUCTIONS  Continue Ceftin 250mg BID for 3 days .  Drink enough water and fluids to keep your urine clear or pale yellow.  Avoid caffeine, tea, and carbonated beverages. They tend to irritate your bladder.  Empty your bladder often. Avoid holding urine for long periods of time.  Empty your bladder before and after sexual intercourse.  After a bowel movement, women should cleanse from front to back. Use each tissue only once.  SEEK MEDICAL CARE IF:  You have back pain.  You develop a fever.  Your symptoms do not begin to resolve within 3 days.  SEEK IMMEDIATE MEDICAL CARE IF:  You have severe back pain or lower abdominal pain.  You develop chills.  You have nausea or vomiting.  You have continued burning or discomfort with urination.        SECONDARY DISCHARGE DIAGNOSES  Diagnosis: SURJIT (acute kidney injury)  Assessment and Plan of Treatment: Avoid taking (NSAIDs) - (ex: Ibuprofen, Advil, Celebrex, Naprosyn)  Avoid taking any nephrotoxic agents (can harm kidneys) - Intravenous contrast for diagnostic testing, combination cold medications.  Have all medications adjusted for your renal function by your Health Care Provider.  Blood pressure control is important.  Take all medication as prescribed.      Diagnosis: H/O subarachnoid hemorrhage  Assessment and Plan of Treatment: Resolving bleed on CT scan.   Coptinue keppra   Follow up with neurology as outpatient. MRI to be obtained as outpatient.    Diagnosis: AMS (altered mental status)  Assessment and Plan of Treatment: Follow up with provider at [facility] and PMD     PRINCIPAL DISCHARGE DIAGNOSIS  Diagnosis: Acute UTI  Assessment and Plan of Treatment: HOME CARE INSTRUCTIONS  Continue Ceftin 250mg BID for 3 days .  Drink enough water and fluids to keep your urine clear or pale yellow.  Avoid caffeine, tea, and carbonated beverages. They tend to irritate your bladder.  Empty your bladder often. Avoid holding urine for long periods of time.  Empty your bladder before and after sexual intercourse.  After a bowel movement, women should cleanse from front to back. Use each tissue only once.  SEEK MEDICAL CARE IF:  You have back pain.  You develop a fever.  Your symptoms do not begin to resolve within 3 days.  SEEK IMMEDIATE MEDICAL CARE IF:  You have severe back pain or lower abdominal pain.  You develop chills.  You have nausea or vomiting.  You have continued burning or discomfort with urination.        SECONDARY DISCHARGE DIAGNOSES  Diagnosis: SURJIT (acute kidney injury)  Assessment and Plan of Treatment: Avoid taking (NSAIDs) - (ex: Ibuprofen, Advil, Celebrex, Naprosyn)  Avoid taking any nephrotoxic agents (can harm kidneys) - Intravenous contrast for diagnostic testing, combination cold medications.  Have all medications adjusted for your renal function by your Health Care Provider.  Blood pressure control is important.  Take all medication as prescribed.      Diagnosis: H/O subarachnoid hemorrhage  Assessment and Plan of Treatment: Resolving bleed on CT scan.   Coptinue keppra   Follow up with neurology as outpatient. MRI to be obtained as outpatient.    Diagnosis: AMS (altered mental status)  Assessment and Plan of Treatment: Follow up with provider at [facility] and PMD  - Consider rEEG to evaluate for focal slowing, epileptiform discharges, or seizures; can be done as outpatient if patient is rapidly improving       PRINCIPAL DISCHARGE DIAGNOSIS  Diagnosis: Acute UTI  Assessment and Plan of Treatment: HOME CARE INSTRUCTIONS  oral ciprofloxacin 500mg PO Q12h (Qtc ok) to complete 3d course till 9/1  Drink enough water and fluids to keep your urine clear or pale yellow.  Avoid caffeine, tea, and carbonated beverages. They tend to irritate your bladder.  Empty your bladder often. Avoid holding urine for long periods of time.  Empty your bladder before and after sexual intercourse.  After a bowel movement, women should cleanse from front to back. Use each tissue only once.  SEEK MEDICAL CARE IF:  You have back pain.  You develop a fever.  Your symptoms do not begin to resolve within 3 days.  SEEK IMMEDIATE MEDICAL CARE IF:  You have severe back pain or lower abdominal pain.  You develop chills.  You have nausea or vomiting.  You have continued burning or discomfort with urination.        SECONDARY DISCHARGE DIAGNOSES  Diagnosis: SURJIT (acute kidney injury)  Assessment and Plan of Treatment: Avoid taking (NSAIDs) - (ex: Ibuprofen, Advil, Celebrex, Naprosyn)  Avoid taking any nephrotoxic agents (can harm kidneys) - Intravenous contrast for diagnostic testing, combination cold medications.  Have all medications adjusted for your renal function by your Health Care Provider.  Blood pressure control is important.  Take all medication as prescribed.      Diagnosis: H/O subarachnoid hemorrhage  Assessment and Plan of Treatment: Resolving bleed on CT scan.   Coptinue keppra   Follow up with neurology as outpatient. MRI to be obtained as outpatient.    Diagnosis: AMS (altered mental status)  Assessment and Plan of Treatment: Follow up with provider at [facility] and PMD  - Consider rEEG to evaluate for focal slowing, epileptiform discharges, or seizures; can be done as outpatient if patient is rapidly improving

## 2022-08-30 ENCOUNTER — TRANSCRIPTION ENCOUNTER (OUTPATIENT)
Age: 82
End: 2022-08-30

## 2022-08-30 VITALS
RESPIRATION RATE: 18 BRPM | SYSTOLIC BLOOD PRESSURE: 144 MMHG | OXYGEN SATURATION: 96 % | TEMPERATURE: 98 F | DIASTOLIC BLOOD PRESSURE: 71 MMHG | HEART RATE: 74 BPM

## 2022-08-30 PROBLEM — I63.9 CEREBRAL INFARCTION, UNSPECIFIED: Chronic | Status: ACTIVE | Noted: 2022-08-28

## 2022-08-30 LAB
AMMONIA BLD-MCNC: 28 UMOL/L — SIGNIFICANT CHANGE UP (ref 11–55)
ANION GAP SERPL CALC-SCNC: 14 MMOL/L — SIGNIFICANT CHANGE UP (ref 5–17)
BUN SERPL-MCNC: 16 MG/DL — SIGNIFICANT CHANGE UP (ref 7–23)
CALCIUM SERPL-MCNC: 9.3 MG/DL — SIGNIFICANT CHANGE UP (ref 8.4–10.5)
CHLORIDE SERPL-SCNC: 104 MMOL/L — SIGNIFICANT CHANGE UP (ref 96–108)
CO2 SERPL-SCNC: 21 MMOL/L — LOW (ref 22–31)
CREAT SERPL-MCNC: 0.9 MG/DL — SIGNIFICANT CHANGE UP (ref 0.5–1.3)
EGFR: 64 ML/MIN/1.73M2 — SIGNIFICANT CHANGE UP
GLUCOSE SERPL-MCNC: 174 MG/DL — HIGH (ref 70–99)
HCT VFR BLD CALC: 40.6 % — SIGNIFICANT CHANGE UP (ref 34.5–45)
HGB BLD-MCNC: 12.7 G/DL — SIGNIFICANT CHANGE UP (ref 11.5–15.5)
LACTATE SERPL-SCNC: 1 MMOL/L — SIGNIFICANT CHANGE UP (ref 0.5–2)
MAGNESIUM SERPL-MCNC: 2 MG/DL — SIGNIFICANT CHANGE UP (ref 1.6–2.6)
MCHC RBC-ENTMCNC: 28.3 PG — SIGNIFICANT CHANGE UP (ref 27–34)
MCHC RBC-ENTMCNC: 31.3 GM/DL — LOW (ref 32–36)
MCV RBC AUTO: 90.4 FL — SIGNIFICANT CHANGE UP (ref 80–100)
NRBC # BLD: 0 /100 WBCS — SIGNIFICANT CHANGE UP (ref 0–0)
PLATELET # BLD AUTO: 282 K/UL — SIGNIFICANT CHANGE UP (ref 150–400)
POTASSIUM SERPL-MCNC: 4.1 MMOL/L — SIGNIFICANT CHANGE UP (ref 3.5–5.3)
POTASSIUM SERPL-SCNC: 4.1 MMOL/L — SIGNIFICANT CHANGE UP (ref 3.5–5.3)
RBC # BLD: 4.49 M/UL — SIGNIFICANT CHANGE UP (ref 3.8–5.2)
RBC # FLD: 13.3 % — SIGNIFICANT CHANGE UP (ref 10.3–14.5)
SODIUM SERPL-SCNC: 139 MMOL/L — SIGNIFICANT CHANGE UP (ref 135–145)
WBC # BLD: 6.8 K/UL — SIGNIFICANT CHANGE UP (ref 3.8–10.5)
WBC # FLD AUTO: 6.8 K/UL — SIGNIFICANT CHANGE UP (ref 3.8–10.5)

## 2022-08-30 PROCEDURE — 97162 PT EVAL MOD COMPLEX 30 MIN: CPT

## 2022-08-30 PROCEDURE — 82435 ASSAY OF BLOOD CHLORIDE: CPT

## 2022-08-30 PROCEDURE — 87086 URINE CULTURE/COLONY COUNT: CPT

## 2022-08-30 PROCEDURE — 85018 HEMOGLOBIN: CPT

## 2022-08-30 PROCEDURE — 83540 ASSAY OF IRON: CPT

## 2022-08-30 PROCEDURE — 85025 COMPLETE CBC W/AUTO DIFF WBC: CPT

## 2022-08-30 PROCEDURE — 84443 ASSAY THYROID STIM HORMONE: CPT

## 2022-08-30 PROCEDURE — 87077 CULTURE AEROBIC IDENTIFY: CPT

## 2022-08-30 PROCEDURE — 86140 C-REACTIVE PROTEIN: CPT

## 2022-08-30 PROCEDURE — 85652 RBC SED RATE AUTOMATED: CPT

## 2022-08-30 PROCEDURE — 99285 EMERGENCY DEPT VISIT HI MDM: CPT

## 2022-08-30 PROCEDURE — 70450 CT HEAD/BRAIN W/O DYE: CPT | Mod: MA

## 2022-08-30 PROCEDURE — 82803 BLOOD GASES ANY COMBINATION: CPT

## 2022-08-30 PROCEDURE — 85027 COMPLETE CBC AUTOMATED: CPT

## 2022-08-30 PROCEDURE — 84480 ASSAY TRIIODOTHYRONINE (T3): CPT

## 2022-08-30 PROCEDURE — 82550 ASSAY OF CK (CPK): CPT

## 2022-08-30 PROCEDURE — 97535 SELF CARE MNGMENT TRAINING: CPT

## 2022-08-30 PROCEDURE — 82565 ASSAY OF CREATININE: CPT

## 2022-08-30 PROCEDURE — 87186 SC STD MICRODIL/AGAR DIL: CPT

## 2022-08-30 PROCEDURE — 82140 ASSAY OF AMMONIA: CPT

## 2022-08-30 PROCEDURE — 81001 URINALYSIS AUTO W/SCOPE: CPT

## 2022-08-30 PROCEDURE — 84207 ASSAY OF VITAMIN B-6: CPT

## 2022-08-30 PROCEDURE — 82962 GLUCOSE BLOOD TEST: CPT

## 2022-08-30 PROCEDURE — 83605 ASSAY OF LACTIC ACID: CPT

## 2022-08-30 PROCEDURE — 80048 BASIC METABOLIC PNL TOTAL CA: CPT

## 2022-08-30 PROCEDURE — 70496 CT ANGIOGRAPHY HEAD: CPT | Mod: MA

## 2022-08-30 PROCEDURE — 97166 OT EVAL MOD COMPLEX 45 MIN: CPT

## 2022-08-30 PROCEDURE — 84436 ASSAY OF TOTAL THYROXINE: CPT

## 2022-08-30 PROCEDURE — 82607 VITAMIN B-12: CPT

## 2022-08-30 PROCEDURE — 36415 COLL VENOUS BLD VENIPUNCTURE: CPT

## 2022-08-30 PROCEDURE — 71045 X-RAY EXAM CHEST 1 VIEW: CPT

## 2022-08-30 PROCEDURE — 84295 ASSAY OF SERUM SODIUM: CPT

## 2022-08-30 PROCEDURE — 83735 ASSAY OF MAGNESIUM: CPT

## 2022-08-30 PROCEDURE — 87637 SARSCOV2&INF A&B&RSV AMP PRB: CPT

## 2022-08-30 PROCEDURE — 85014 HEMATOCRIT: CPT

## 2022-08-30 PROCEDURE — 97530 THERAPEUTIC ACTIVITIES: CPT

## 2022-08-30 PROCEDURE — 84425 ASSAY OF VITAMIN B-1: CPT

## 2022-08-30 PROCEDURE — 80053 COMPREHEN METABOLIC PANEL: CPT

## 2022-08-30 PROCEDURE — 82652 VIT D 1 25-DIHYDROXY: CPT

## 2022-08-30 PROCEDURE — 82330 ASSAY OF CALCIUM: CPT

## 2022-08-30 PROCEDURE — 84132 ASSAY OF SERUM POTASSIUM: CPT

## 2022-08-30 PROCEDURE — 82947 ASSAY GLUCOSE BLOOD QUANT: CPT

## 2022-08-30 RX ORDER — ACETAMINOPHEN 500 MG
650 TABLET ORAL ONCE
Refills: 0 | Status: COMPLETED | OUTPATIENT
Start: 2022-08-30 | End: 2022-08-30

## 2022-08-30 RX ORDER — CIPROFLOXACIN LACTATE 400MG/40ML
500 VIAL (ML) INTRAVENOUS EVERY 12 HOURS
Refills: 0 | Status: DISCONTINUED | OUTPATIENT
Start: 2022-08-30 | End: 2022-08-30

## 2022-08-30 RX ORDER — MOXIFLOXACIN HYDROCHLORIDE TABLETS, 400 MG 400 MG/1
1 TABLET, FILM COATED ORAL
Qty: 4 | Refills: 0
Start: 2022-08-30 | End: 2022-08-31

## 2022-08-30 RX ADMIN — Medication 650 MILLIGRAM(S): at 06:54

## 2022-08-30 RX ADMIN — Medication 100 MILLIGRAM(S): at 12:41

## 2022-08-30 RX ADMIN — SODIUM CHLORIDE 75 MILLILITER(S): 9 INJECTION, SOLUTION INTRAVENOUS at 05:35

## 2022-08-30 RX ADMIN — LOSARTAN POTASSIUM 50 MILLIGRAM(S): 100 TABLET, FILM COATED ORAL at 05:35

## 2022-08-30 RX ADMIN — Medication 650 MILLIGRAM(S): at 05:54

## 2022-08-30 RX ADMIN — CEFEPIME 100 MILLIGRAM(S): 1 INJECTION, POWDER, FOR SOLUTION INTRAMUSCULAR; INTRAVENOUS at 05:35

## 2022-08-30 RX ADMIN — Medication 75 MICROGRAM(S): at 05:35

## 2022-08-30 RX ADMIN — LEVETIRACETAM 500 MILLIGRAM(S): 250 TABLET, FILM COATED ORAL at 05:35

## 2022-08-30 RX ADMIN — Medication 50 MILLIGRAM(S): at 05:35

## 2022-08-30 NOTE — DISCHARGE NOTE NURSING/CASE MANAGEMENT/SOCIAL WORK - PATIENT PORTAL LINK FT
You can access the FollowMyHealth Patient Portal offered by [facility] by registering at the following website: [email or web address]. By joining [facility]’s FollowMyHealth portal, you will also be able to view your health information using other applications (apps) compatible with our system.

## 2022-08-30 NOTE — CHART NOTE - NSCHARTNOTEFT_GEN_A_CORE
Medicine NP : Discharge Med recommendation.      Discharge medications reviewed with ,    Wants to patient only to receive depakote for AMS, as patient did well in the hospital and not to try any other meds for confusion or agitation.  Daughter refusing to have her mother take Namenda and Xarelto,   As per  ,patient should not be continue on the above med. Medicine NP : Discharge Med recommendation.      Discharge medications reviewed with ,    Dr. Streeter wants to patient only to receive Depakote for AMS, as patient did well in the hospital and not to try any other meds for confusion or agitation.  Daughter refusing to have her mother take Namenda and Xarelto,   As per  ,patient should not be continue on the above med.

## 2022-08-30 NOTE — PROGRESS NOTE ADULT - SUBJECTIVE AND OBJECTIVE BOX
Patient is a 82y old  Female who presents with a chief complaint of metabolic encephalopathy (29 Aug 2022 18:38)      SUBJECTIVE / OVERNIGHT EVENTS: ptn is euvolemic    MEDICATIONS  (STANDING):  cefepime   IVPB 1000 milliGRAM(s) IV Intermittent every 12 hours  levETIRAcetam 500 milliGRAM(s) Oral two times a day  levothyroxine 75 MICROGram(s) Oral daily  losartan 50 milliGRAM(s) Oral daily  memantine 5 milliGRAM(s) Oral every 12 hours  metoprolol succinate ER 50 milliGRAM(s) Oral daily  rivaroxaban 10 milliGRAM(s) Oral daily  simvastatin 20 milliGRAM(s) Oral at bedtime  sodium chloride 0.45%. 1000 milliLiter(s) (75 mL/Hr) IV Continuous <Continuous>  thiamine Injectable 100 milliGRAM(s) IV Push daily    MEDICATIONS  (PRN):  diVALproex Sprinkle 125 milliGRAM(s) Oral every 6 hours PRN agitation  melatonin 5 milliGRAM(s) Oral at bedtime PRN Insomnia  senna 2 Tablet(s) Oral at bedtime PRN Constipation      Vital Signs Last 24 Hrs  T(F): 98.6 (22 @ 05:30), Max: 98.6 (22 @ 05:30)  HR: 83 (22 @ 05:30) (83 - 101)  BP: 120/67 (22 @ 05:30) (120/67 - 155/85)  RR: 17 (22 @ 05:30) (17 - 18)  SpO2: 96% (22 @ 05:30) (96% - 97%)  Telemetry:   CAPILLARY BLOOD GLUCOSE        I&O's Summary    29 Aug 2022 07:01  -  30 Aug 2022 07:00  --------------------------------------------------------  IN: 1400 mL / OUT: 100 mL / NET: 1300 mL        PHYSICAL EXAM:  GENERAL: NAD, well-developed  HEAD:  Atraumatic, Normocephalic  EYES: EOMI, PERRLA, conjunctiva and sclera clear  NECK: Supple, No JVD  CHEST/LUNG: Clear to auscultation bilaterally; No wheeze  HEART: Regular rate and rhythm; No murmurs, rubs, or gallops  ABDOMEN: Soft, Nontender, Nondistended; Bowel sounds present  EXTREMITIES:  2+ Peripheral Pulses, No clubbing, cyanosis, or edema  PSYCH: AAOx3  NEUROLOGY: non-focal  SKIN: No rashes or lesions    LABS:                        14.2   8.51  )-----------( 274      ( 28 Aug 2022 14:51 )             45.9         140  |  105  |  23  ----------------------------<  143<H>  4.5   |  22  |  1.44<H>    Ca    9.7      28 Aug 2022 18:34    TPro  7.5  /  Alb  4.1  /  TBili  0.2  /  DBili  x   /  AST  32  /  ALT  42  /  AlkPhos  115  -      CARDIAC MARKERS ( 29 Aug 2022 07:22 )  x     / x     / 70 U/L / x     / x          Urinalysis Basic - ( 28 Aug 2022 14:51 )    Color: Light Orange / Appearance: Turbid / S.016 / pH: x  Gluc: x / Ketone: Negative  / Bili: Negative / Urobili: Negative   Blood: x / Protein: 100 / Nitrite: Positive   Leuk Esterase: Large / RBC: 10 /hpf / WBC >50   Sq Epi: x / Non Sq Epi: 3 / Bacteria: Many        RADIOLOGY & ADDITIONAL TESTS:    Imaging Personally Reviewed:    Consultant(s) Notes Reviewed:      Care Discussed with Consultants/Other Providers:  
[facility], Division of Infectious Diseases  FERN Jenkins Y. Patel, S. Shah, G. Casimir  [phone]  ([phone] - weekdays after 5pm and weekends)    Name: JOSIE HUGHES  Age/Gender: 82y Female  MRN: [id number]    Interval History:  Patient seen and examined this morning.   Patients aid helping with breakfast  Per aid and daughter, patient is back to her baseline  Notes reviewed. Afebrile     Allergies: No Known Allergies    Objective:  Vitals:   T(F): 98.5 (22 @ 08:56), Max: 98.6 (22 @ 05:30)  HR: 80 (22 @ 08:56) (80 - 101)  BP: 147/83 (22 @ 08:56) (120/67 - 155/85)  RR: 18 (22 @ 08:56) (17 - 18)  SpO2: 97% (22 @ 08:56) (96% - 97%)  Physical Examination:  General: no acute distress, nontoxic appearing  HEENT: NC/AT, anicteric, neck supple  Respiratory: no acc muscle use, breathing comfortably  Cardiovascular: S1 and S2 present  Gastrointestinal: normal appearing, nondistended  Extremities: no edema, no cyanosis  Skin: no visible rash    Laboratory Studies:  CBC:                       12.7   6.80  )-----------( 282      ( 30 Aug 2022 07:51 )             40.6     WBC Trend:  6.80 22 @ 07:51  8.51 22 @ 14:51    CMP:     139  |  104  |  16  ----------------------------<  174<H>  4.1   |  21<L>  |  0.90    Ca    9.3      30 Aug 2022 07:55  Mg     2.0         TPro  7.5  /  Alb  4.1  /  TBili  0.2  /  DBili  x   /  AST  32  /  ALT  42  /  AlkPhos  115      Creatinine, Serum: 0.90 mg/dL (22 @ 07:55)  Creatinine, Serum: 1.44 mg/dL (22 @ 18:34)  Creatinine, Serum: 1.50 mg/dL (22 @ 14:51)      LIVER FUNCTIONS - ( 28 Aug 2022 18:34 )  Alb: 4.1 g/dL / Pro: 7.5 g/dL / ALK PHOS: 115 U/L / ALT: 42 U/L / AST: 32 U/L / GGT: x             Urinalysis Basic - ( 28 Aug 2022 14:51 )  Color: Light Orange / Appearance: Turbid / S.016 / pH: x  Gluc: x / Ketone: Negative  / Bili: Negative / Urobili: Negative   Blood: x / Protein: 100 / Nitrite: Positive   Leuk Esterase: Large / RBC: 10 /hpf / WBC >50   Sq Epi: x / Non Sq Epi: 3 / Bacteria: Many    Microbiology: reviewed   Culture - Urine (collected 22 @ 19:39)  Source: Clean Catch Clean Catch (Midstream)  Preliminary Report (22 @ 21:12):    >100,000 CFU/ml Citrobacter freundii complex    Radiology: reviewed     Medications:  ciprofloxacin     Tablet 500 milliGRAM(s) Oral every 12 hours  diVALproex Sprinkle 125 milliGRAM(s) Oral every 6 hours PRN  levETIRAcetam 500 milliGRAM(s) Oral two times a day  levothyroxine 75 MICROGram(s) Oral daily  losartan 50 milliGRAM(s) Oral daily  melatonin 5 milliGRAM(s) Oral at bedtime PRN  metoprolol succinate ER 50 milliGRAM(s) Oral daily  senna 2 Tablet(s) Oral at bedtime PRN  simvastatin 20 milliGRAM(s) Oral at bedtime  sodium chloride 0.45%. 1000 milliLiter(s) IV Continuous <Continuous>  thiamine Injectable 100 milliGRAM(s) IV Push daily    Antimicrobials:  ciprofloxacin     Tablet 500 milliGRAM(s) Oral every 12 hours  
Patient is a 82y old  Female who presents with a chief complaint of metabolic encephalopathy (29 Aug 2022 14:36)      SUBJECTIVE / OVERNIGHT EVENTS: ptn is aphasic, easily agitated, was agitated overnight. got Depakote 125 at 1 am and Melatonin at 3 am. presently awake, alert, confused, spoke w daughter and her  x 45 min this am and in the afternoon on the phone again while at ptn's bedside family has been pushing to DC the ptn, however in light of worsening Lactate i could not clear the ptn to go. will reassess in am. 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 Daughter is refusing DVT ppx    MEDICATIONS  (STANDING):  cefTRIAXone   IVPB 1000 milliGRAM(s) IV Intermittent every 24 hours  levETIRAcetam 500 milliGRAM(s) Oral two times a day  levothyroxine 75 MICROGram(s) Oral daily  losartan 50 milliGRAM(s) Oral daily  memantine 5 milliGRAM(s) Oral every 12 hours  metoprolol succinate ER 50 milliGRAM(s) Oral daily  rivaroxaban 10 milliGRAM(s) Oral daily  simvastatin 20 milliGRAM(s) Oral at bedtime  sodium chloride 0.45%. 1000 milliLiter(s) (75 mL/Hr) IV Continuous <Continuous>  thiamine Injectable 100 milliGRAM(s) IV Push daily    MEDICATIONS  (PRN):  diVALproex Sprinkle 125 milliGRAM(s) Oral every 6 hours PRN agitation  melatonin 5 milliGRAM(s) Oral at bedtime PRN Insomnia  senna 2 Tablet(s) Oral at bedtime PRN Constipation      Vital Signs Last 24 Hrs  T(F): 98.3 (22 @ 12:29), Max: 100 (22 @ 01:30)  HR: 101 (22 @ 12:29) (74 - 127)  BP: 144/76 (22 @ 12:29) (125/66 - 170/84)  RR: 18 (22 @ 12:29) (15 - 18)  SpO2: 96% (22 @ 12:29) (93% - 99%)  Telemetry:   CAPILLARY BLOOD GLUCOSE        I&O's Summary    28 Aug 2022 07:01  -  29 Aug 2022 07:00  --------------------------------------------------------  IN: 50 mL / OUT: 125 mL / NET: -75 mL        PHYSICAL EXAM:  GENERAL: NAD, well-developed  HEAD:  Atraumatic, Normocephalic  EYES: EOMI, PERRLA, conjunctiva and sclera clear  NECK: Supple, No JVD  CHEST/LUNG: Clear to auscultation bilaterally; No wheeze  HEART: Regular rate and rhythm; No murmurs, rubs, or gallops  ABDOMEN: Soft, Nontender, Nondistended; Bowel sounds present  EXTREMITIES:  2+ Peripheral Pulses, No clubbing, cyanosis, or edema  PSYCH: AAOx3  NEUROLOGY: non-focal  SKIN: No rashes or lesions    LABS:                        14.2   8.51  )-----------( 274      ( 28 Aug 2022 14:51 )             45.9     08-    140  |  105  |  23  ----------------------------<  143<H>  4.5   |  22  |  1.44<H>    Ca    9.7      28 Aug 2022 18:34    TPro  7.5  /  Alb  4.1  /  TBili  0.2  /  DBili  x   /  AST  32  /  ALT  42  /  AlkPhos  115        CARDIAC MARKERS ( 29 Aug 2022 07:22 )  x     / x     / 70 U/L / x     / x          Urinalysis Basic - ( 28 Aug 2022 14:51 )    Color: Light Orange / Appearance: Turbid / S.016 / pH: x  Gluc: x / Ketone: Negative  / Bili: Negative / Urobili: Negative   Blood: x / Protein: 100 / Nitrite: Positive   Leuk Esterase: Large / RBC: 10 /hpf / WBC >50   Sq Epi: x / Non Sq Epi: 3 / Bacteria: Many        RADIOLOGY & ADDITIONAL TESTS:    Imaging Personally Reviewed:    Consultant(s) Notes Reviewed:      Care Discussed with Consultants/Other Providers:

## 2022-08-30 NOTE — CHART NOTE - NSCHARTNOTEFT_GEN_A_CORE
Per primary team, patient is back to baseline and family would like the patient discharged.    Impression: Altered mentation in a patient with UTI, history of prior stroke, resolving SAH.     Routine EEG can be done outpatient. No additional neurological work up recommended at this time.     Patient can follow up with general neurology at [address] [place] [address] after discharge. Please instruct the patient to call [phone] to schedule this appointment.     Please call neurology, if needed. Signing off.    Kin Gilman MD  Neurology PGY-3

## 2022-08-30 NOTE — DISCHARGE NOTE NURSING/CASE MANAGEMENT/SOCIAL WORK - NSDCPEFALRISK_GEN_ALL_CORE
For information on Fall & Injury Prevention, visit: [email or web address] OR  [email or web address] OR  https://www.cdc.gov/steadi/patient.html

## 2022-08-30 NOTE — DISCHARGE NOTE NURSING/CASE MANAGEMENT/SOCIAL WORK - NSDCFUADDAPPT_GEN_ALL_CORE_FT
Patient can follow up with general neurology at [address] [place] [address] after discharge. Please instruct the patient to call [phone] to schedule this appointment

## 2022-08-30 NOTE — PROGRESS NOTE ADULT - ASSESSMENT
Patient is a 82 year old female with PMH of HTN, HLD, CVA, CKD who presented to the ED with altered mental status from [facility] physical therapy.     AMS  - improved and back to baseline per family and aid  possible etiology - UTI vs recent medication changes    - noted elevated lactate, pt afebrile, no leukocytosis     -- lactate normalized this morning, remains afebrile   - UA with pyuria    - prior cultures reviewed, no positive cultures noted    - urine culture grew Citrobacter freundii complex    -- typically resistant to pcns and 3rd generation cephalosporins, usually sensitive to cefepime, FQs     - s/p ceftriaxone > changed to cefepime last night    - will discontinue cefepime and switch to oral ciprofloxacin 500mg PO Q12h (Qtc ok) to complete 3d course on 9/1   - Neurology following   Daughter would like patient to return to rehab  Discharge planning per primary team    D/w Dr. Ferdinand Joseph M.D.  [facility], Division of Infectious Diseases  [phone]  After 5pm on weekdays and all day on weekends - please call [phone]
82y female who presents to the ED with altered mental status from [facility] physical therapy. Patient was in rehab for hx of stroke. According to the patients family they noticed she was altered today around 7am. Daughter saw her at 4pm and was talking to her yesterday. The aide was with her until 10pm where she was arousing at the time. The patient had a recent change in medication from 50 to 100 in gabapentin according to the daughter. Last time something similar happened it was due to oxycodone medication treatment. Denies any fever, chills, cough, shortness of breath, urinary symptoms.    A/P  8/28:daughter at bedside, ptn's MS is back at baseline, she has UTI  would abstain from Depakote or any narcotics. 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 plan to dc back to [facility] once cleared by neuro  dvt ppx w po Xarelto  cont outptn meds    8/29: ptn is aphasic, easily agitated, was agitated overnight. got Depakote 125 at 1 am and Melatonin at 3 am. presently awake, alert, confused, spoke w daughter and her  x 45 min this am and in the afternoon on the phone again while at ptn's bedside family has been pushing to DC the ptn, however in light of worsening Lactate i could not clear the ptn to go. will reassess in am. 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 Daughter is refusing DVT ppx  
82y female who presents to the ED with altered mental status from [facility] physical therapy. Patient was in rehab for hx of stroke. According to the patients family they noticed she was altered today around 7am. Daughter saw her at 4pm and was talking to her yesterday. The aide was with her until 10pm where she was arousing at the time. The patient had a recent change in medication from 50 to 100 in gabapentin according to the daughter. Last time something similar happened it was due to oxycodone medication treatment. Denies any fever, chills, cough, shortness of breath, urinary symptoms.    A/P  8/28:daughter at bedside, ptn's MS is back at baseline, she has UTI  would abstain from Depakote or any narcotics. 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 plan to dc back to [facility] once cleared by neuro  dvt ppx w po Xarelto  cont outptn meds    8/29: ptn is aphasic, easily agitated, was agitated overnight. got Depakote 125 at 1 am and Melatonin at 3 am. presently awake, alert, confused, spoke w daughter and her  x 45 min this am and in the afternoon on the phone again while at ptn's bedside family has been pushing to DC the ptn, however in light of worsening Lactate i could not clear the ptn to go. will reassess in am. 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 Daughter is refusing DVT ppx  8/30: euvolemic, UCx grew Citrobacter , sens not available, clinically has improved. DC planning

## 2022-09-02 LAB
PYRIDOXAL PHOS SERPL-MCNC: 7.1 UG/L — SIGNIFICANT CHANGE UP (ref 3.4–65.2)
VIT B1 SERPL-MCNC: 149.1 NMOL/L — SIGNIFICANT CHANGE UP (ref 66.5–200)

## 2022-10-12 NOTE — OCCUPATIONAL THERAPY INITIAL EVALUATION ADULT - LIGHT TOUCH SENSATION, RLE, REHAB EVAL
Caller: Theron Villavicencio    Relationship to patient: Emergency Contact    Best call back number: [phone]    Type of visit: HOSPITAL FOLLOW UP    Requested date: ASAP    Additional notes:PATIENTS  CALLED TO SCHEDULE APPT WITH LEOPOLDO, PATIENT WAS SEEN IN ER ON 9/9/22 AND HAD A CONSULT WITH LEOPOLDO ON 9/11/22. PER LEOPOLDO:From a neurosurgical standpoint patient needs to be optimized medically and get through this hospitalization with her altered mental status and then see us in outpatient setting for consideration for surgery.  This was discussed with family and patient and they understand that the patient will need to be off all of her disease modifying medicines for at least 90 days prior to even considering an extensive surgery.     Hopefully we will avoid this but with his badgers her scan looks she may require surgery.    PLEASE CALL PATIENTS  TO SCHEDULE/ADVISE.    THANK YOU         moderate impairment

## 2022-10-13 NOTE — SPEECH LANGUAGE PATHOLOGY EVALUATION - SLP ABLE TO IDENTIFY PICTURES
How Severe Is It?: mild
Is This A New Presentation, Or A Follow-Up?: Discoloration
within functional limits

## 2023-09-11 ENCOUNTER — APPOINTMENT (OUTPATIENT)
Dept: PHYSICAL MEDICINE AND REHAB | Facility: CLINIC | Age: 83
End: 2023-09-11
Payer: MEDICARE

## 2023-09-11 DIAGNOSIS — R26.9 UNSPECIFIED ABNORMALITIES OF GAIT AND MOBILITY: ICD-10-CM

## 2023-09-11 DIAGNOSIS — R25.2 CRAMP AND SPASM: ICD-10-CM

## 2023-09-11 PROCEDURE — 99213 OFFICE O/P EST LOW 20 MIN: CPT

## 2023-10-11 DIAGNOSIS — M21.372 FOOT DROP, LEFT FOOT: ICD-10-CM

## 2024-01-17 ENCOUNTER — APPOINTMENT (OUTPATIENT)
Dept: PHYSICAL MEDICINE AND REHAB | Facility: CLINIC | Age: 84
End: 2024-01-17
Payer: MEDICARE

## 2024-01-17 VITALS — HEART RATE: 80 BPM | DIASTOLIC BLOOD PRESSURE: 83 MMHG | OXYGEN SATURATION: 98 % | SYSTOLIC BLOOD PRESSURE: 131 MMHG

## 2024-01-17 DIAGNOSIS — G81.94 HEMIPLEGIA, UNSPECIFIED AFFECTING LEFT NONDOMINANT SIDE: ICD-10-CM

## 2024-01-17 PROCEDURE — 99214 OFFICE O/P EST MOD 30 MIN: CPT

## 2024-01-17 RX ORDER — ONABOTULINUMTOXINA 100 [USP'U]/1
100 INJECTION, POWDER, LYOPHILIZED, FOR SOLUTION INTRADERMAL; INTRAMUSCULAR
Qty: 5 | Refills: 0 | Status: ACTIVE | OUTPATIENT
Start: 2024-01-17

## 2024-01-17 NOTE — PHYSICAL EXAM
[FreeTextEntry1] : General: Well-developed female sitting in a wheelchair comfortably.  Follows commands with visual cues and . Extremities: No cyanosis, clubbing, or edema noted.  Motor: Right upper extremity/right lower extremity: Tone normal, active range of motion within functional limits with 5/5 motor power throughout.  Thumb to digit opposition intact.  Left upper extremity brunch from stage II recovery.  Shoulder abduction to 90 degrees, external rotation to neutral passively. Tone: Shoulder adductors MAS = 1+ Elbow flexors MAS = 3+, passive extension to -5 degrees with difficulty. Pronators/supinators MAS = 0 Wrist flexors MAS = 1, passive extension to 80 degrees MCP flexors MAS = 2 to 2+ FDS MAS = 1 with the wrist flexed; MAS = 3 to 3+ with the wrist extended FDP MAS = 1 with the wrist flexed; MAS = 2+ to 3 with the wrist extended FPL MAS = 0 with the wrist flexed, MAS = 2 with the wrist extended FPB MAS = 0 AP MAS = 0  Left lower extremity: Hip flexion 4-4+/5 MP, knee extension 3 -/5 MP.  Ankle dorsiflexion 0-1/5 MP, ankle plantarflexion 3/5 MP, ankle eversion less than 2/5 MP, ankle inversion 2/5 MP. Ankle plantarflexion tone MAS = 0-1 Ankle inverted tone MAS = 0-1. Able to passively dorsiflex ankle 2 to 3 degrees above neutral with knee extended.  Sensory: Diminished light touch in the left lower extremity  Functional status: Sit to stand transfer with assistance.  Patient ambulated with assistance with toe strike and foot inversion.  Genu recurvatum noted.  No knee buckling.

## 2024-01-17 NOTE — ASSESSMENT
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  Discussed with patient's family that she requires an AFO to get it to give a consistent heel strike and to help prevent genu recurvatum.  Patient requires a custom molded AFO to prevent foot drop, control knee extension and for safe ambulation.  Patient will require the AFO on a permanent basis.  Prescription provided for a left custom molded SAFO with varus control, instep strap, full footplate, flexible toe.  Patient to continue outpatient physical therapy for gait training with her new AFO.   Patient requires a Botox injection to the elbow flexors, and finger flexors to improve ease of stretching, comfort and ultimately fit the patient with a resting hand splint to prevent contracture.  Will seek authorization for Botox injection to the following protocol:  Left pectoralis major------------------- 90 units Left latissimus dorsi-------------------- 60 units Left biceps-------------------------------- 80 units Left brachialis---------------------------- 80 units Left FDS----------------------------------- 80 units Left FDP----------------------------------- 70 units Left lumbricals --------------------------- 40 units  Total ----------------------------------------- 500 units  I spent a total of 30 minutes on the date of the encounter evaluating and treating the patient including a discussion of treatment options.

## 2024-01-17 NOTE — HISTORY OF PRESENT ILLNESS
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  Main complaint is that she has a very stiff hand and she is unable to tolerate a resting hand splint.  No hygiene issues, no pain reported.  Family also reports that she ambulates in her home with assistance without an AFO and without assistive device.  Main complaint is that her left foot turns in and tends to drag the left foot.  No falls reported.  Patient is receiving outpatient physical therapy.  They do report having Botox injection to the left upper extremity 4 to 5 years ago with good relief of tone.  Patient has had an AFO in the distant past currently is not using one.

## 2024-03-07 ENCOUNTER — APPOINTMENT (OUTPATIENT)
Dept: PHYSICAL MEDICINE AND REHAB | Facility: CLINIC | Age: 84
End: 2024-03-07
Payer: MEDICARE

## 2024-03-07 VITALS
RESPIRATION RATE: 16 BRPM | DIASTOLIC BLOOD PRESSURE: 72 MMHG | OXYGEN SATURATION: 98 % | HEART RATE: 80 BPM | SYSTOLIC BLOOD PRESSURE: 107 MMHG | TEMPERATURE: 97.9 F

## 2024-03-07 PROCEDURE — 95874 GUIDE NERV DESTR NEEDLE EMG: CPT

## 2024-03-07 PROCEDURE — 64644 CHEMODENERV 1 EXTREM 5/> MUS: CPT

## 2024-03-07 NOTE — PROCEDURE
[Consent] : consent was given by patient or guardian [Site Verification] : the injection site was verified [Post-Injection Instructions Provided] : post-injection instructions were provided [Total Units: ___] : [unfilled] units [Total Vials: ___] : [unfilled] vials were used [Total Waste: ___] : with [unfilled] wasted [de-identified] : Procedural note for Botox injection:  Under sterile conditions the following muscles were injected with Botox in a 2 mL dilution and with EMG guidance:  Left pectoralis major-------------------------- 90 units (4 sites) Left latissimus dorsi--------------------------- 60 units (3 sites) Left biceps--------------------------------------- 80 units (4 sites) Left brachialis----------------------------------- 80 units (4 sites) Left FDS------------------------------------------ 80 units (3 sites) Left FDP------------------------------------------- 70 units (3 sites) Left lumbricals----------------------------------- 40 units (10 units x 4)  Total------------------------------------------------- 500 units

## 2024-03-07 NOTE — ASSESSMENT
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  Tolerated well.

## 2024-04-15 ENCOUNTER — APPOINTMENT (OUTPATIENT)
Dept: PHYSICAL MEDICINE AND REHAB | Facility: CLINIC | Age: 84
End: 2024-04-15
Payer: MEDICARE

## 2024-04-15 DIAGNOSIS — M21.862 OTHER SPECIFIED ACQUIRED DEFORMITIES OF LEFT LOWER LEG: ICD-10-CM

## 2024-04-15 DIAGNOSIS — G81.14 SPASTIC HEMIPLEGIA AFFECTING LEFT NONDOMINANT SIDE: ICD-10-CM

## 2024-04-15 DIAGNOSIS — R26.1 PARALYTIC GAIT: ICD-10-CM

## 2024-04-15 PROCEDURE — 99213 OFFICE O/P EST LOW 20 MIN: CPT

## 2024-04-15 RX ORDER — ONABOTULINUMTOXINA 100 [USP'U]/1
100 INJECTION, POWDER, LYOPHILIZED, FOR SOLUTION INTRADERMAL; INTRAMUSCULAR
Qty: 5 | Refills: 0 | Status: ACTIVE | OUTPATIENT
Start: 2024-04-15

## 2024-04-15 NOTE — PHYSICAL EXAM
[FreeTextEntry1] : General: Well-developed female sitting in a wheelchair comfortably.  Follows commands with visual cues and . Extremities: No cyanosis, clubbing, or edema noted.  Motor: Right upper extremity/right lower extremity: Tone normal, active range of motion within functional limits with 5/5 motor power throughout.  Thumb to digit opposition intact.  Left upper extremity Brunstrum stage II recovery.  Shoulder abduction to 90 degrees, external rotation to neutral passively. Tone: Shoulder adductors MAS = 0-1 Elbow flexors MAS = 1-1+, passive extension to -5 degrees Pronators/supinators MAS = 0 Wrist flexors MAS = 1, passive extension to 80 degrees MCP flexors MAS = 0 FDS MAS =0 with the wrist flexed; MAS = 0-1 with the wrist extended FDP MAS = 0 with the wrist flexed; MAS = 0-1 with the wrist extended FPL MAS = 0 with the wrist flexed, MAS = 2 with the wrist extended FPB MAS = 0 AP MAS = 0  Left lower extremity: Hip flexion 4-4+/5 MP, knee extension 3-/5 MP.  Ankle dorsiflexion 0-1/5 MP, ankle plantarflexion 3/5 MP, ankle eversion <2/5 MP, ankle inversion 2/5 MP. Able to passively dorsiflex ankle 2 to 3 degrees above neutral with knee extended.  Sensory: Diminished light touch in the left lower extremity  Functional status: Sit to stand transfer with assistance.

## 2024-04-15 NOTE — ASSESSMENT
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  Prescription provided for a comfy WHO and will reduce dose to the lumbricals, FDS, FDP.  Informed patient and family to bring AFO to all visits for assessment.  Patient to continue outpatient physical therapy.  New Botox injection protocol:   Left pectoralis major------------------- 90 units Left latissimus dorsi-------------------- 60 units Left biceps-------------------------------- 80 units Left brachialis---------------------------- 80 units Left FDS----------------------------------- 75 units Left FDP----------------------------------- 60 units Left lumbricals --------------------------- 20 units  Total ----------------------------------------- 465 units  I spent a total of 20 minutes on the date of the encounter evaluating and treating the patient including a discussion of treatment options.

## 2024-04-15 NOTE — HISTORY OF PRESENT ILLNESS
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  Patient and family report significant reduction in tone at the left hand.  Improved ease of stretching, no pain complaints, no hygiene issues.  Patient received her left AFO approximately 2 months ago has only been using it in physical therapy.  Unfortunately did not bring the AFO to this office visit for evaluation.  Family also reports that she minimally ambulates in her home with assistance without an AFO and without assistive device.  No falls reported.  Patient is receiving outpatient physical therapy.

## 2024-08-16 NOTE — SPEECH LANGUAGE PATHOLOGY EVALUATION - SLP CRITERIA FOR SKILLED THERAPEUTIC INTERVENTIONS MET
skilled criteria for intervention met [Normal] : no acute distress, well nourished, well developed and well-appearing [de-identified] : skin tag joellen left breast

## 2025-05-23 NOTE — ED ADULT NURSE NOTE - SUICIDE SCREENING QUESTION 2
Patient has been contacted with his arrival time for his surgery on Tuesday. He is to be there at 7 am nothing to eat or drink after midnight.  
Patient unable to complete